# Patient Record
Sex: FEMALE | Race: WHITE | Employment: FULL TIME | URBAN - METROPOLITAN AREA
[De-identification: names, ages, dates, MRNs, and addresses within clinical notes are randomized per-mention and may not be internally consistent; named-entity substitution may affect disease eponyms.]

---
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
## 2018-02-28 NOTE — PROGRESS NOTES
"  SUBJECTIVE:   Tracee Dominguez is a 17 year old female who presents to clinic today for the following health issues:    STD Testing-no known exposure. Same partner. Denies abnormal vaginal discharge, itching, odor, pelvic or abdominal pain, abnormal urinary symptoms, fevers.     Also requesting new prescription for her inhaler.    Problem list and histories reviewed & adjusted, as indicated.  Additional history: as documented    Patient Active Problem List   Diagnosis     Intermittent asthma     Hyperhidrosis     Wart     Birth control     Dyspareunia     Foreign body (FB) in soft tissue     Past Surgical History:   Procedure Laterality Date     ENT SURGERY      broke jaw in      NO HISTORY OF SURGERY       REMOVE FOREIGN BODY UPPER EXTREMITY N/A 6/28/2017    Procedure: REMOVE FOREIGN BODY UPPER EXTREMITY;  Removal of Nexplanon;  Surgeon: Al Stratton MD;  Location:  OR       Social History   Substance Use Topics     Smoking status: Never Smoker     Smokeless tobacco: Never Used     Alcohol use No     Family History   Problem Relation Age of Onset     Thyroid Disease Mother      Depression Mother      Anxiety Disorder Mother      Depression Father      Anxiety Disorder Father            Reviewed and updated as needed this visit by clinical staff       Reviewed and updated as needed this visit by Provider         ROS:  Constitutional, HEENT, cardiovascular, pulmonary, gi and gu systems are negative, except as otherwise noted.    OBJECTIVE:     BP 97/55  Pulse 71  Temp 97.2  F (36.2  C) (Oral)  Ht 5' 6\" (1.676 m)  Wt 141 lb (64 kg)  LMP 02/09/2018 (Exact Date)  SpO2 96%  BMI 22.76 kg/m2  Body mass index is 22.76 kg/(m^2).  GENERAL: healthy, alert and no distress  RESP: lungs clear to auscultation - no rales, rhonchi or wheezes  CV: regular rate and rhythm, normal S1 S2, no S3 or S4, no murmur, click or rub, no peripheral edema and peripheral pulses strong  ABDOMEN: soft, nontender, no " hepatosplenomegaly, no masses and bowel sounds normal   (female): normal female external genitalia, normal urethral meatus, vaginal mucosa, normal cervix/adnexa/uterus without masses or discharge  MS: no gross musculoskeletal defects noted, no edema  SKIN: no suspicious lesions or rashes    Diagnostic Test Results:  none     ASSESSMENT/PLAN:   1. Screen for STD (sexually transmitted disease)  Will follow up with patient when results available. Encouraged safe sexual practices.  - Chlamydia trachomatis PCR  - Neisseria gonorrhoeae PCR  - Hepatitis B surface antigen  - HIV Antigen Antibody Combo  - Anti Treponema  - Hepatitis C antibody    2. Mild intermittent asthma without complication  Will refill inhaler prescription at this time, but patient advised she is due for follow up on her asthma with a primary care provider.  - albuterol (PROAIR HFA/PROVENTIL HFA/VENTOLIN HFA) 108 (90 BASE) MCG/ACT Inhaler; Inhale 2 puffs into the lungs every 6 hours as needed for shortness of breath / dyspnea  Dispense: 2 Inhaler; Refill: 1    DEREK Jacobo [facility]

## 2018-03-01 ENCOUNTER — OFFICE VISIT (OUTPATIENT)
Dept: OBGYN | Facility: CLINIC | Age: 18
End: 2018-03-01
Payer: COMMERCIAL

## 2018-03-01 VITALS
OXYGEN SATURATION: 96 % | HEIGHT: 66 IN | WEIGHT: 141 LBS | SYSTOLIC BLOOD PRESSURE: 97 MMHG | HEART RATE: 71 BPM | DIASTOLIC BLOOD PRESSURE: 55 MMHG | BODY MASS INDEX: 22.66 KG/M2 | TEMPERATURE: 97.2 F

## 2018-03-01 DIAGNOSIS — J45.20 MILD INTERMITTENT ASTHMA WITHOUT COMPLICATION: ICD-10-CM

## 2018-03-01 DIAGNOSIS — Z11.3 SCREEN FOR STD (SEXUALLY TRANSMITTED DISEASE): Primary | ICD-10-CM

## 2018-03-01 LAB — T PALLIDUM IGG+IGM SER QL: NEGATIVE

## 2018-03-01 PROCEDURE — 87591 N.GONORRHOEAE DNA AMP PROB: CPT | Performed by: NURSE PRACTITIONER

## 2018-03-01 PROCEDURE — 36415 COLL VENOUS BLD VENIPUNCTURE: CPT | Performed by: NURSE PRACTITIONER

## 2018-03-01 PROCEDURE — 86780 TREPONEMA PALLIDUM: CPT | Performed by: NURSE PRACTITIONER

## 2018-03-01 PROCEDURE — 86803 HEPATITIS C AB TEST: CPT | Performed by: NURSE PRACTITIONER

## 2018-03-01 PROCEDURE — 87491 CHLMYD TRACH DNA AMP PROBE: CPT | Performed by: NURSE PRACTITIONER

## 2018-03-01 PROCEDURE — 87389 HIV-1 AG W/HIV-1&-2 AB AG IA: CPT | Performed by: NURSE PRACTITIONER

## 2018-03-01 PROCEDURE — 87340 HEPATITIS B SURFACE AG IA: CPT | Performed by: NURSE PRACTITIONER

## 2018-03-01 PROCEDURE — 99213 OFFICE O/P EST LOW 20 MIN: CPT | Performed by: NURSE PRACTITIONER

## 2018-03-01 RX ORDER — ALBUTEROL SULFATE 90 UG/1
2 AEROSOL, METERED RESPIRATORY (INHALATION) EVERY 6 HOURS PRN
Qty: 2 INHALER | Refills: 1 | Status: SHIPPED | OUTPATIENT
Start: 2018-03-01 | End: 2019-05-31

## 2018-03-01 ASSESSMENT — PAIN SCALES - GENERAL: PAINLEVEL: NO PAIN (0)

## 2018-03-01 NOTE — MR AVS SNAPSHOT
"              After Visit Summary   3/1/2018    Tracee Dominguez    MRN: [id number]           Patient Information     Date Of Birth          2000        Visit Information        Provider Department      3/1/2018 8:10 AM Merced Anton APRN CNP [facility]        Today's Diagnoses     Screen for STD (sexually transmitted disease)    -  1    Mild intermittent asthma without complication           Follow-ups after your visit        Who to contact     If you have questions or need follow up information about today's clinic visit or your schedule please contact [facility] directly at [phone].  Normal or non-critical lab and imaging results will be communicated to you by GroundedPowerhart, letter or phone within 4 business days after the clinic has received the results. If you do not hear from us within 7 days, please contact the clinic through GroundedPowerhart or phone. If you have a critical or abnormal lab result, we will notify you by phone as soon as possible.  Submit refill requests through Crunched or call your pharmacy and they will forward the refill request to us. Please allow 3 business days for your refill to be completed.          Additional Information About Your Visit        MyChart Information     Crunched lets you send messages to your doctor, view your test results, renew your prescriptions, schedule appointments and more. To sign up, go to [email or web address] . Click on \"Log in\" on the left side of the screen, which will take you to the Welcome page. Then click on \"Sign up Now\" on the right side of the page.     You will be asked to enter the access code listed below, as well as some personal information. Please follow the directions to create your username and password.     Your access code is: [email or web address]  Expires: 2018  9:12 AM     Your access code will  in 90 days. If you need help or a new code, please call your [facility] or [phone].        Care " "EveryWhere ID     This is your Care EveryWhere ID. This could be used by other organizations to access your [place] medical records  [id number]        Your Vitals Were     Pulse Temperature Height Last Period Pulse Oximetry BMI (Body Mass Index)    71 97.2  F (36.2  C) (Oral) 5' 6\" (1.676 m) 02/09/2018 (Exact Date) 96% 22.76 kg/m2       Blood Pressure from Last 3 Encounters:   03/01/18 97/55   10/09/17 125/69   07/11/17 104/62    Weight from Last 3 Encounters:   03/01/18 141 lb (64 kg) (71 %)*   10/09/17 150 lb (68 kg) (81 %)*   07/11/17 148 lb (67.1 kg) (80 %)*     * Growth percentiles are based on [facility] 2-20 Years data.              We Performed the Following     Anti Treponema     Chlamydia trachomatis PCR     Hepatitis B surface antigen     Hepatitis C antibody     HIV Antigen Antibody Combo     Neisseria gonorrhoeae PCR          Today's Medication Changes          These changes are accurate as of 3/1/18  8:34 AM.  If you have any questions, ask your nurse or doctor.               Stop taking these medicines if you haven't already. Please contact your care team if you have questions.     oxyCODONE-acetaminophen 5-325 MG per tablet   Commonly known as:  PERCOCET   Stopped by:  Merced Anton APRN CNP                Where to get your medicines      These medications were sent to [place] [address] [facility] - [address] [address]     Phone:  [phone]     albuterol 108 (90 BASE) MCG/ACT Inhaler                Primary Care Provider Office Phone # Fax #    Fatou Enrique [phone] [address] [facility] [address]        Equal Access to Services     [facility] GERMAN AH: Nguyễn dolan Sonandini, waaxda luqadaha, qaybta kaalmada juan pablo, jenifer owens. So [facility] [phone].    ATENCIÓN: Si habla español, tiene a wang disposición servicios gratuitos de asistencia lingüística. Lljessica al [phone].    We " comply with applicable federal civil rights laws and [place] laws. We do not discriminate on the basis of race, color, national origin, age, disability, sex, sexual orientation, or gender identity.            Thank you!     [facility] you for [facility]  [facility] your [facility]. Our goal is always to provide you with excellent care. Hearing back from our patients is one way we can continue to improve our services. Please take a few minutes to complete the written survey that you may receive in the mail after your visit with us. Thank you!             Your Updated Medication List - Protect others around you: Learn how to safely use, store and throw away your medicines at www.disposemymeds.org.          This list is accurate as of 3/1/18  8:34 AM.  Always use your most recent med list.                   Brand Name Dispense Instructions for use Diagnosis    albuterol 108 (90 BASE) MCG/ACT Inhaler    PROAIR HFA/PROVENTIL HFA/VENTOLIN HFA    2 Inhaler    Inhale 2 puffs into the lungs every 6 hours as needed for shortness of breath / dyspnea    Mild intermittent asthma without complication       desogestrel-ethinyl estradiol 0.15-30 MG-MCG per tablet    APRI    84 tablet    Take 1 tablet by mouth daily    Birth control counseling

## 2018-03-01 NOTE — NURSING NOTE
"Chief Complaint   Patient presents with     STD     Testing       Initial BP 97/55  Pulse 71  Temp 97.2  F (36.2  C) (Oral)  Ht 5' 6\" (1.676 m)  Wt 141 lb (64 kg)  LMP 02/09/2018 (Exact Date)  SpO2 96%  BMI 22.76 kg/m2 Estimated body mass index is 22.76 kg/(m^2) as calculated from the following:    Height as of this encounter: 5' 6\" (1.676 m).    Weight as of this encounter: 141 lb (64 kg)..  BP completed using cuff size: claudia Grimaldo CMA    "

## 2018-03-02 LAB
C TRACH DNA SPEC QL NAA+PROBE: NEGATIVE
HBV SURFACE AG SERPL QL IA: NONREACTIVE
HCV AB SERPL QL IA: NONREACTIVE
HIV 1+2 AB+HIV1 P24 AG SERPL QL IA: NONREACTIVE
N GONORRHOEA DNA SPEC QL NAA+PROBE: NEGATIVE
SPECIMEN SOURCE: NORMAL
SPECIMEN SOURCE: NORMAL

## 2018-03-05 ENCOUNTER — TELEPHONE (OUTPATIENT)
Dept: OBGYN | Facility: CLINIC | Age: 18
End: 2018-03-05

## 2018-06-26 NOTE — PROGRESS NOTES
SUBJECTIVE:                                                    Tracee Dominguez is a 17 year old female who presents to clinic today for the following health issues:    URINARY TRACT SYMPTOMS      Duration: 1 week    Description  Dysuria    Intensity:  moderate    Accompanying signs and symptoms:  Fever/chills: no   Flank pain no   Nausea and vomiting: YES- nausea  Vaginal symptoms: itching  Abdominal/Pelvic Pain: no     History  History of frequent UTI's: YES  History of kidney stones: no  Sexually Active: YES  Possibility of pregnancy: Don't Know but doesn't think so. Menstrual cycle has been abnormal as pt stopped and restarted birth control. Thinks cycle should be starting soon     Precipitating or alleviating factors: None    Therapies tried and outcome: otc med - unsure of name   Outcome: no relief      Problem list and histories reviewed & adjusted, as indicated.  Additional history: as documented     Patient Active Problem List   Diagnosis     Intermittent asthma     Hyperhidrosis     Wart     Birth control     Dyspareunia     Past Surgical History:   Procedure Laterality Date     ENT SURGERY      broke jaw in      NO HISTORY OF SURGERY       REMOVE FOREIGN BODY UPPER EXTREMITY N/A 6/28/2017    Procedure: REMOVE FOREIGN BODY UPPER EXTREMITY;  Removal of Nexplanon;  Surgeon: Al Stratton MD;  Location:  OR       Social History   Substance Use Topics     Smoking status: Current Every Day Smoker     Smokeless tobacco: Never Used     Alcohol use No     Family History   Problem Relation Age of Onset     Thyroid Disease Mother      Depression Mother      Anxiety Disorder Mother      Depression Father      Anxiety Disorder Father          Current Outpatient Prescriptions   Medication Sig Dispense Refill     clotrimazole (LOTRIMIN) 1 % cream Apply topically 2 times daily 15 g 1     desogestrel-ethinyl estradiol (APRI) 0.15-30 MG-MCG per tablet Take 1 tablet by mouth daily 84 tablet 3      "albuterol (PROAIR HFA/PROVENTIL HFA/VENTOLIN HFA) 108 (90 BASE) MCG/ACT Inhaler Inhale 2 puffs into the lungs every 6 hours as needed for shortness of breath / dyspnea (Patient not taking: Reported on 6/27/2018) 2 Inhaler 1     No Known Allergies    ROS:  Constitutional, HEENT, cardiovascular, pulmonary, gi and gu systems are negative, except as otherwise noted.    OBJECTIVE:     /67  Pulse 67  Temp 97.6  F (36.4  C) (Oral)  Resp 14  Ht 5' 6\" (1.676 m)  Wt 144 lb (65.3 kg)  LMP  (LMP Unknown)  SpO2 98%  BMI 23.24 kg/m2  Body mass index is 23.24 kg/(m^2).  GENERAL: healthy, alert and no distress  RESP: lungs clear to auscultation - no rales, rhonchi or wheezes  CV: regular rate and rhythm, normal S1 S2, no S3 or S4, no murmur, click or rub, no peripheral edema and peripheral pulses strong  ABDOMEN: soft, nontender, no hepatosplenomegaly, no masses and bowel sounds normal   (female): normal female external genitalia, normal urethral meatus, vaginal   mucosa, normal cervix/adnexa/uterus without masses or discharge. Some external labial erythema.   MA was present on exam   MS: no gross musculoskeletal defects noted, no edema    Diagnostic Test Results:  Results for orders placed or performed in visit on 06/27/18 (from the past 24 hour(s))   Wet prep   Result Value Ref [facility]    Specimen Description Vagina     Wet Prep No Trichomonas seen     Wet Prep No clue cells seen     Wet Prep No yeast seen        ASSESSMENT/PLAN:         ICD-10-CM    1. Vaginal discharge N89.8 Chlamydia trachomatis PCR     Neisseria gonorrhoeae PCR     Wet prep     *UA reflex to Microscopic and Culture ([facility] (except [place] and [place])     CANCELED: *UA reflex to Microscopic and Culture ([facility] (except [place] and [place])   2. Vaginal itching L29.8 clotrimazole (LOTRIMIN) 1 % cream   she was unable to leave UA at time of visit.   Start lotrimin twice a day. warning signs discussed. "   Follow up  2 wks as needed. Labs pending      Yovany Manley PA-C  [facility]

## 2018-06-27 ENCOUNTER — OFFICE VISIT (OUTPATIENT)
Dept: FAMILY MEDICINE | Facility: CLINIC | Age: 18
End: 2018-06-27
Payer: COMMERCIAL

## 2018-06-27 VITALS
OXYGEN SATURATION: 98 % | WEIGHT: 144 LBS | HEART RATE: 67 BPM | RESPIRATION RATE: 14 BRPM | BODY MASS INDEX: 23.14 KG/M2 | HEIGHT: 66 IN | DIASTOLIC BLOOD PRESSURE: 67 MMHG | SYSTOLIC BLOOD PRESSURE: 116 MMHG | TEMPERATURE: 97.6 F

## 2018-06-27 DIAGNOSIS — N89.8 VAGINAL DISCHARGE: Primary | ICD-10-CM

## 2018-06-27 DIAGNOSIS — N89.8 VAGINAL ITCHING: ICD-10-CM

## 2018-06-27 PROBLEM — M79.5 FOREIGN BODY (FB) IN SOFT TISSUE: Status: RESOLVED | Noted: 2017-06-27 | Resolved: 2018-06-27

## 2018-06-27 LAB
SPECIMEN SOURCE: NORMAL
WET PREP SPEC: NORMAL

## 2018-06-27 PROCEDURE — 87210 SMEAR WET MOUNT SALINE/INK: CPT | Performed by: PHYSICIAN ASSISTANT

## 2018-06-27 PROCEDURE — 87591 N.GONORRHOEAE DNA AMP PROB: CPT | Performed by: PHYSICIAN ASSISTANT

## 2018-06-27 PROCEDURE — 99213 OFFICE O/P EST LOW 20 MIN: CPT | Performed by: PHYSICIAN ASSISTANT

## 2018-06-27 PROCEDURE — 87491 CHLMYD TRACH DNA AMP PROBE: CPT | Performed by: PHYSICIAN ASSISTANT

## 2018-06-27 RX ORDER — CLOTRIMAZOLE 1 %
CREAM (GRAM) TOPICAL 2 TIMES DAILY
Qty: 15 G | Refills: 1 | Status: SHIPPED | OUTPATIENT
Start: 2018-06-27 | End: 2018-07-27

## 2018-06-27 NOTE — LETTER
My Asthma Action Plan  Name: Tracee Dominguez   YOB: 2000  Date: 6/26/2018   My doctor: Yovany Manley PA-C   My clinic: [facility]        My Control Medicine: { :669373}  My Rescue Medicine: { :896491}  {AAP include Oral Steroid:608786} My Asthma Severity: { :686235}  Avoid your asthma triggers: { :227749}        {Is patient a child or adult?:448689}       GREEN ZONE   Good Control    I feel good    No cough or wheeze    Can work, sleep and play without asthma symptoms       Take your asthma control medicine every day.     1. If exercise triggers your asthma, take your rescue medication    15 minutes before exercise or sports, and    During exercise if you have asthma symptoms  2. Spacer to use with inhaler: If you have a spacer, make sure to use it with your inhaler             YELLOW ZONE Getting Worse  I have ANY of these:    I do not feel good    Cough or wheeze    Chest feels tight    Wake up at night   1. Keep taking your Green Zone medications  2. Start taking your rescue medicine:    every 20 minutes for up to 1 hour. Then every 4 hours for 24-48 hours.  3. If you stay in the Yellow Zone for more than 12-24 hours, contact your doctor.  4. If you do not return to the Green Zone in 12-24 hours or you get worse, start taking your oral steroid medicine if prescribed by your provider.           RED ZONE Medical Alert - Get Help  I have ANY of these:    I feel awful    Medicine is not helping    Breathing getting harder    Trouble walking or talking    Nose opens wide to breathe       1. Take your rescue medicine NOW  2. If your provider has prescribed an oral steroid medicine, start taking it NOW  3. Call your doctor NOW  4. If you are still in the Red Zone after 20 minutes and you have not reached your doctor:    Take your rescue medicine again and    Call 911 or go to the emergency room right away    See your regular doctor within 2 weeks of an Emergency Room or Urgent Care  visit for follow-up treatment.          Annual Reminders:  Meet with Asthma Educator,  Flu Shot in the Fall, consider Pneumonia Vaccination for patients with asthma (aged 19 and older).    Pharmacy:    [id number] - [place], [place] - [address] [facility] [address] [facility] - [place], [place] - [address]                      Asthma Triggers  How To Control Things That Make Your Asthma Worse    Triggers are things that make your asthma worse.  Look at the list below to help you find your triggers and what you can do about them.  You can help prevent asthma flare-ups by staying away from your triggers.      Trigger                                                          What you can do   Cigarette Smoke  Tobacco smoke can make asthma worse. Do not allow smoking in your home, car or around you.  Be sure no one smokes at a child s day care or school.  If you smoke, ask your health care provider for ways to help you quit.  Ask family members to quit too.  Ask your health care provider for a referral to Quit Plan to help you quit smoking, or call 9-219-533-PLAN.     Colds, Flu, Bronchitis  These are common triggers of asthma. Wash your hands often.  Don t touch your eyes, nose or mouth.  Get a flu shot every year.     Dust Mites  These are tiny bugs that live in cloth or carpet. They are too small to see. Wash sheets and blankets in hot water every week.   Encase pillows and mattress in dust mite proof covers.  Avoid having carpet if you can. If you have carpet, vacuum weekly.   Use a dust mask and HEPA vacuum.   Pollen and Outdoor Mold  Some people are allergic to trees, grass, or weed pollen, or molds. Try to keep your windows closed.  Limit time out doors when pollen count is high.   Ask you health care provider about taking medicine during allergy season.     Animal Dander  Some people are allergic to skin flakes, urine or saliva from pets with fur or feathers. Keep pets with fur or feathers out of  your home.    If you can t keep the pet outdoors, then keep the pet out of your bedroom.  Keep the bedroom door closed.  Keep pets off cloth furniture and away from stuffed toys.     Mice, Rats, and Cockroaches  Some people are allergic to the waste from these pests.   Cover food and garbage.  Clean up spills and food crumbs.  Store grease in the refrigerator.   Keep food out of the bedroom.   Indoor Mold  This can be a trigger if your home has high moisture. Fix leaking faucets, pipes, or other sources of water.   Clean moldy surfaces.  Dehumidify basement if it is damp and smelly.   Smoke, Strong Odors, and Sprays  These can reduce air quality. Stay away from strong odors and sprays, such as perfume, powder, hair spray, paints, smoke incense, paint, cleaning products, candles and new carpet.   Exercise or Sports  Some people with asthma have this trigger. Be active!  Ask your doctor about taking medicine before sports or exercise to prevent symptoms.    Warm up for 5-10 minutes before and after sports or exercise.     Other Triggers of Asthma  Cold air:  Cover your nose and mouth with a scarf.  Sometimes laughing or crying can be a trigger.  Some medicines and food can trigger asthma.

## 2018-06-27 NOTE — MR AVS SNAPSHOT
"              After Visit Summary   6/27/2018    Tracee Dominguez    MRN: [id number]           Patient Information     Date Of Birth          2000        Visit Information        Provider Department      6/27/2018 10:20 AM Yovany Manley PA-C [facility]        Today's Diagnoses     Vaginal discharge    -  1    Vaginal itching           Follow-ups after your visit        Who to contact     If you have questions or need follow up information about today's clinic visit or your schedule please contact [facility] directly at [phone].  Normal or non-critical lab and imaging results will be communicated to you by MyChart, letter or phone within 4 business days after the clinic has received the results. If you do not hear from us within 7 days, please contact the clinic through MyChart or phone. If you have a critical or abnormal lab result, we will notify you by phone as soon as possible.  Submit refill requests through [employer] or call your pharmacy and they will forward the refill request to us. Please allow 3 business days for your refill to be completed.          Additional Information About Your Visit        Care EveryWhere ID     This is your Care EveryWhere ID. This could be used by other organizations to access your [place] medical records  [id number]        Your Vitals Were     Pulse Temperature Respirations Height Last Period Pulse Oximetry    67 97.6  F (36.4  C) (Oral) 14 5' 6\" (1.676 m) (LMP Unknown) 98%    BMI (Body Mass Index)                   23.24 kg/m2            Blood Pressure from Last 3 Encounters:   06/27/18 116/67   03/01/18 97/55   10/09/17 125/69    Weight from Last 3 Encounters:   06/27/18 144 lb (65.3 kg) (74 %)*   03/01/18 141 lb (64 kg) (71 %)*   10/09/17 150 lb (68 kg) (81 %)*     * Growth percentiles are based on CDC 2-20 Years data.              We Performed the Following     *UA reflex to Microscopic and Culture ([place] [facility] " (except [place] and [place])     Chlamydia trachomatis PCR     Neisseria gonorrhoeae PCR     Wet prep          Today's Medication Changes          These changes are accurate as of 6/27/18 10:59 AM.  If you have any questions, ask your nurse or doctor.               Start taking these medicines.        Dose/Directions    clotrimazole 1 % cream   Commonly known as:  LOTRIMIN   Used for:  Vaginal itching   Started by:  Yovany Manley PA-C        Apply topically 2 times daily   Quantity:  15 g   Refills:  1            Where to get your medicines      These medications were sent to [place] [address] [facility] - [facility] [address]  [address] [facility] [address]     Phone:  [phone]     clotrimazole 1 % cream                Primary Care Provider Office Phone # Fax #    Fatou Enrique [phone] [address] [facility] [address]        Equal Access to Services     [facility] : Hadii saundra ku hadasho Soomaali, waaxda luqadaha, qaybta kaalmada adeegyada, waxay brightin hayisatun nahomy dejesus . So [facility] [phone].    ATENCIÓN: Si youla espharrison, tiene a wang disposición servicios gratuitos de asistencia lingüística. Llame al [phone].    We comply with applicable federal civil rights laws and [place] laws. We do not discriminate on the basis of race, color, national origin, age, disability, sex, sexual orientation, or gender identity.            Thank you!     Thank you for choosing [facility]  for your care. Our goal is always to provide you with excellent care. Hearing back from our patients is one way we can continue to improve our services. Please take a few minutes to complete the written survey that you may receive in the mail after your visit with us. Thank you!             Your Updated Medication List - Protect others around you: Learn how to safely use, store and throw away your medicines at www.disposemymeds.org.          This list  is accurate as of 6/27/18 10:59 AM.  Always use your most recent med list.                   Brand Name Dispense Instructions for use Diagnosis    albuterol 108 (90 Base) MCG/ACT Inhaler    PROAIR HFA/PROVENTIL HFA/VENTOLIN HFA    2 Inhaler    Inhale 2 puffs into the lungs every 6 hours as needed for shortness of breath / dyspnea    Mild intermittent asthma without complication       clotrimazole 1 % cream    LOTRIMIN    15 g    Apply topically 2 times daily    Vaginal itching       desogestrel-ethinyl estradiol 0.15-30 MG-MCG per tablet    APRI    84 tablet    Take 1 tablet by mouth daily    Birth control counseling

## 2018-06-27 NOTE — NURSING NOTE
"Chief Complaint   Patient presents with     Urinary Problem     Health Maintenance     ACT, HPV       Initial /67  Pulse 67  Temp 97.6  F (36.4  C) (Oral)  Resp 14  Ht 5' 6\" (1.676 m)  Wt 144 lb (65.3 kg)  LMP  (LMP Unknown)  SpO2 98%  BMI 23.24 kg/m2 Estimated body mass index is 23.24 kg/(m^2) as calculated from the following:    Height as of this encounter: 5' 6\" (1.676 m).    Weight as of this encounter: 144 lb (65.3 kg).  Medication Reconciliation: complete  Renetta Morrow CMA    "

## 2018-06-28 ASSESSMENT — ASTHMA QUESTIONNAIRES: ACT_TOTALSCORE: 23

## 2018-07-26 NOTE — PROGRESS NOTES
SUBJECTIVE:   Tracee Dominguez is a 18 year old female who presents to clinic today for the following health issues:    STD Testing  She is currently using combined oral contraceptive pill and wants to discuss non hormonal methods.   Also, requests pregnancy test-no concerns, no missed cycles and continues her oral contraceptive pill, just wants reassurance.  LMP 7/13/18    Patient has no known STI exposure, no new partner. Denies abnormal vaginal discharge, odor, irritation, but mild intermittent itching. Denies lesions, pelvic pain, fevers, bowel changes. No new products.   Feels she has been on combined oral contraceptive pills for a few years and wants to try something without hormones. Had to have Nexplanon surgically removed, so not interested in anything implanted, inserting into her body.    Problem list and histories reviewed & adjusted, as indicated.  Additional history: as documented    Patient Active Problem List   Diagnosis     Intermittent asthma     Hyperhidrosis     Wart     Birth control     Dyspareunia     Past Surgical History:   Procedure Laterality Date     ENT SURGERY      broke jaw in      NO HISTORY OF SURGERY       REMOVE FOREIGN BODY UPPER EXTREMITY N/A 6/28/2017    Procedure: REMOVE FOREIGN BODY UPPER EXTREMITY;  Removal of Nexplanon;  Surgeon: Al Stratton MD;  Location:  OR       Social History   Substance Use Topics     Smoking status: Light Tobacco Smoker     Packs/day: 0.25     Types: Cigarettes     Smokeless tobacco: Never Used      Comment: 1 cig a day     Alcohol use No     Family History   Problem Relation Age of Onset     Thyroid Disease Mother      Depression Mother      Anxiety Disorder Mother      Depression Father      Anxiety Disorder Father            Reviewed and updated as needed this visit by clinical staff       Reviewed and updated as needed this visit by Provider         ROS:  Constitutional, HEENT, cardiovascular, pulmonary, gi and gu systems  "are negative, except as otherwise noted.    OBJECTIVE:     /71  Pulse 65  Temp 97.8  F (36.6  C) (Oral)  Ht 5' 6\" (1.676 m)  Wt 140 lb 9.6 oz (63.8 kg)  LMP 07/13/2018 (Approximate)  SpO2 98%  BMI 22.69 kg/m2  Body mass index is 22.69 kg/(m^2).   GENERAL: healthy, alert and no distress  RESP: lungs clear to auscultation - no rales, rhonchi or wheezes  CV: regular rate and rhythm, normal S1 S2, no S3 or S4, no murmur, click or rub, no peripheral edema and peripheral pulses strong  ABDOMEN: soft, nontender, no hepatosplenomegaly, no masses and bowel sounds normal   (female): normal female external genitalia, normal urethral meatus, vaginal mucosa, normal cervix/adnexa/uterus without masses or discharge  MS: no gross musculoskeletal defects noted, no edema  SKIN: no suspicious lesions or rashes  PSYCH: mentation appears normal, affect normal/bright    Diagnostic Test Results:  Results for orders placed or performed in visit on 07/27/18 (from the past 24 hour(s))   Beta HCG qual IFA urine - [facility] and [place]   Result Value Ref Range    Beta HCG Qual IFA Urine Negative NEG^Negative      Wet prep   Result Value Ref Range    Specimen Description Vagina     Wet Prep No yeast seen     Wet Prep No clue cells seen     Wet Prep No Trichomonas seen      A/P:  (Z11.3) Screen for STD (sexually transmitted disease)  (primary encounter diagnosis)  Plan: Chlamydia trachomatis PCR, Neisseria         gonorrhoeae PCR, Hepatitis B surface antigen,         Hepatitis C antibody, Treponema Abs w Reflex to        RPR and Titer, HIV Antigen Antibody Combo, Wet         prep        (Z32.00) Encounter for pregnancy test, result unknown  Comment: Patient aware of negative results  Plan: Beta HCG qual IFA urine - [facility] and [place]          (Z30.09) Birth control counseling  Comment: Reviewed non hormonal options with patient. Not interested in copper IUD or diaphragm. Plans condoms now and will be consistent. To call if " alternate desired.    DEREK Jacobo [facility]

## 2018-07-27 ENCOUNTER — OFFICE VISIT (OUTPATIENT)
Dept: OBGYN | Facility: CLINIC | Age: 18
End: 2018-07-27
Payer: COMMERCIAL

## 2018-07-27 VITALS
BODY MASS INDEX: 22.6 KG/M2 | WEIGHT: 140.6 LBS | OXYGEN SATURATION: 98 % | HEART RATE: 65 BPM | TEMPERATURE: 97.8 F | DIASTOLIC BLOOD PRESSURE: 71 MMHG | SYSTOLIC BLOOD PRESSURE: 104 MMHG | HEIGHT: 66 IN

## 2018-07-27 DIAGNOSIS — Z11.3 SCREEN FOR STD (SEXUALLY TRANSMITTED DISEASE): Primary | ICD-10-CM

## 2018-07-27 DIAGNOSIS — Z30.09 BIRTH CONTROL COUNSELING: ICD-10-CM

## 2018-07-27 DIAGNOSIS — Z32.00 ENCOUNTER FOR PREGNANCY TEST, RESULT UNKNOWN: ICD-10-CM

## 2018-07-27 LAB
BETA HCG QUAL IFA URINE: NEGATIVE
SPECIMEN SOURCE: NORMAL
WET PREP SPEC: NORMAL

## 2018-07-27 PROCEDURE — 87340 HEPATITIS B SURFACE AG IA: CPT | Performed by: NURSE PRACTITIONER

## 2018-07-27 PROCEDURE — 86803 HEPATITIS C AB TEST: CPT | Performed by: NURSE PRACTITIONER

## 2018-07-27 PROCEDURE — 84703 CHORIONIC GONADOTROPIN ASSAY: CPT | Performed by: NURSE PRACTITIONER

## 2018-07-27 PROCEDURE — 87389 HIV-1 AG W/HIV-1&-2 AB AG IA: CPT | Performed by: NURSE PRACTITIONER

## 2018-07-27 PROCEDURE — 36415 COLL VENOUS BLD VENIPUNCTURE: CPT | Performed by: NURSE PRACTITIONER

## 2018-07-27 PROCEDURE — 87491 CHLMYD TRACH DNA AMP PROBE: CPT | Performed by: NURSE PRACTITIONER

## 2018-07-27 PROCEDURE — 86780 TREPONEMA PALLIDUM: CPT | Performed by: NURSE PRACTITIONER

## 2018-07-27 PROCEDURE — 87591 N.GONORRHOEAE DNA AMP PROB: CPT | Performed by: NURSE PRACTITIONER

## 2018-07-27 PROCEDURE — 99213 OFFICE O/P EST LOW 20 MIN: CPT | Performed by: NURSE PRACTITIONER

## 2018-07-27 PROCEDURE — 87210 SMEAR WET MOUNT SALINE/INK: CPT | Performed by: NURSE PRACTITIONER

## 2018-07-27 ASSESSMENT — PAIN SCALES - GENERAL: PAINLEVEL: NO PAIN (0)

## 2018-07-27 NOTE — NURSING NOTE
"Chief Complaint   Patient presents with     STD     Testing       Initial /71  Pulse 65  Temp 97.8  F (36.6  C) (Oral)  Ht 5' 6\" (1.676 m)  Wt 140 lb 9.6 oz (63.8 kg)  LMP 07/13/2018 (Approximate)  SpO2 98%  BMI 22.69 kg/m2 Estimated body mass index is 22.69 kg/(m^2) as calculated from the following:    Height as of this encounter: 5' 6\" (1.676 m).    Weight as of this encounter: 140 lb 9.6 oz (63.8 kg)..  BP completed using cuff size: claudia Grimaldo CMA    "

## 2018-07-27 NOTE — MR AVS SNAPSHOT
"              After Visit Summary   7/27/2018    Tracee Dominguez    MRN: [id number]           Patient Information     Date Of Birth          2000        Visit Information        Provider Department      7/27/2018 11:50 AM Merced Anton APRN CNP [facility]        Today's Diagnoses     Screen for STD (sexually transmitted disease)    -  1    Encounter for pregnancy test, result unknown        Birth control counseling           Follow-ups after your visit        Who to contact     If you have questions or need follow up information about today's clinic visit or your schedule please contact [facility] directly at [phone].  Normal or non-critical lab and imaging results will be communicated to you by MyChart, letter or phone within 4 business days after the clinic has received the results. If you do not hear from us within 7 days, please contact the clinic through MyChart or phone. If you have a critical or abnormal lab result, we will notify you by phone as soon as possible.  Submit refill requests through [employer] or call your pharmacy and they will forward the refill request to us. Please allow 3 business days for your refill to be completed.          Additional Information About Your Visit        Care EveryWhere ID     This is your Care EveryWhere ID. This could be used by other organizations to access your [place] medical records  [id number]        Your Vitals Were     Pulse Temperature Height Last Period Pulse Oximetry BMI (Body Mass Index)    65 97.8  F (36.6  C) (Oral) 5' 6\" (1.676 m) 07/13/2018 (Approximate) 98% 22.69 kg/m2       Blood Pressure from Last 3 Encounters:   07/27/18 104/71   06/27/18 116/67   03/01/18 97/55    Weight from Last 3 Encounters:   07/27/18 140 lb 9.6 oz (63.8 kg)   06/27/18 144 lb (65.3 kg) (74 %)*   03/01/18 141 lb (64 kg) (71 %)*     * Growth percentiles are based on CDC 2-20 Years data.              We Performed the Following     Beta HCG " qual IFA urine - [facility] [place]     Chlamydia trachomatis PCR     Hepatitis B surface antigen     Hepatitis C antibody     HIV Antigen Antibody Combo     Neisseria gonorrhoeae PCR     Treponema Abs w Reflex to RPR and Titer     Wet prep          Today's Medication Changes          These changes are accurate as of 7/27/18  1:30 PM.  If you have any questions, ask your nurse or doctor.               Stop taking these medicines if you haven't already. Please contact your care team if you have questions.     clotrimazole 1 % cream   Commonly known as:  LOTRIMIN   Stopped by:  Merced Anton APRN CNP                    Primary Care Provider Office Phone # Fax #    Fatou Enrique [phone] [address] [facility] [address]        Equal Access to Services     [facility] : Hadii saundra wallace hadasho Soomaali, waaxda luqadaha, qaybta kaalmada adeegyada, waxay idiin haybrad dejesus . So [facility] [phone].    ATENCIÓN: Si habla español, tiene a wang disposición servicios gratuitos de asistencia lingüística. Llame al [phone].    We comply with applicable federal civil rights laws and [place] laws. We do not discriminate on the basis of race, color, national origin, age, disability, sex, sexual orientation, or gender identity.            Thank you!     Thank you for choosing [facility]  for your care. Our goal is always to provide you with excellent care. Hearing back from our patients is one way we can continue to improve our services. Please take a few minutes to complete the written survey that you may receive in the mail after your visit with us. Thank you!             Your Updated Medication List - Protect others around you: Learn how to safely use, store and throw away your medicines at www.disposemymeds.org.          This list is accurate as of 7/27/18  1:30 PM.  Always use your most recent med list.                   Brand Name Dispense Instructions  for use Diagnosis    albuterol 108 (90 Base) MCG/ACT Inhaler    PROAIR HFA/PROVENTIL HFA/VENTOLIN HFA    2 Inhaler    Inhale 2 puffs into the lungs every 6 hours as needed for shortness of breath / dyspnea    Mild intermittent asthma without complication       desogestrel-ethinyl estradiol 0.15-30 MG-MCG per tablet    APRI    84 tablet    Take 1 tablet by mouth daily    Birth control counseling

## 2018-07-27 NOTE — LETTER
[facility]  [address] [facility] [address]  Phone: [phone]    07/30/18    Tracee Dominguez  [address] [place] [id number]      Dear Tracee-     Your labs are negative for sexually transmitted infections. Please follow up as needed.     Sincerely,      DEREK Jacobo CNP

## 2018-07-28 LAB — T PALLIDUM AB SER QL: NONREACTIVE

## 2018-07-30 LAB
HBV SURFACE AG SERPL QL IA: NONREACTIVE
HCV AB SERPL QL IA: NONREACTIVE
HIV 1+2 AB+HIV1 P24 AG SERPL QL IA: NONREACTIVE

## 2018-09-24 ENCOUNTER — OFFICE VISIT (OUTPATIENT)
Dept: OBGYN | Facility: CLINIC | Age: 18
End: 2018-09-24
Payer: COMMERCIAL

## 2018-09-24 VITALS
WEIGHT: 145 LBS | DIASTOLIC BLOOD PRESSURE: 62 MMHG | SYSTOLIC BLOOD PRESSURE: 122 MMHG | HEART RATE: 82 BPM | OXYGEN SATURATION: 97 % | BODY MASS INDEX: 23.4 KG/M2

## 2018-09-24 DIAGNOSIS — T19.2XXA FOREIGN BODY IN VAGINA, INITIAL ENCOUNTER: Primary | ICD-10-CM

## 2018-09-24 PROCEDURE — 99213 OFFICE O/P EST LOW 20 MIN: CPT | Performed by: NURSE PRACTITIONER

## 2018-09-24 NOTE — PROGRESS NOTES
SUBJECTIVE:   Tracee Dominguez is a 18 year old female who presents to clinic today for the following health issues:    Patient and her significant other lost a condom on Saturday night. They have both tried and have not been able to remove it from the vagina. Fell off during intercourse, stopped activity. Denies pelvic pain, abnormal vaginal discharge, odor, irritation. Not sexually active since then. Using oral contraceptive pills.    Problem list and histories reviewed & adjusted, as indicated.  Additional history: as documented    Patient Active Problem List   Diagnosis     Intermittent asthma     Hyperhidrosis     Wart     Birth control     Dyspareunia     Past Surgical History:   Procedure Laterality Date     ENT SURGERY      broke jaw in      NO HISTORY OF SURGERY       REMOVE FOREIGN BODY UPPER EXTREMITY N/A 6/28/2017    Procedure: REMOVE FOREIGN BODY UPPER EXTREMITY;  Removal of Nexplanon;  Surgeon: Al Stratton MD;  Location:  OR       Social History   Substance Use Topics     Smoking status: Light Tobacco Smoker     Packs/day: 0.25     Types: Cigarettes     Smokeless tobacco: Never Used      Comment: 1 cig a day     Alcohol use No     Family History   Problem Relation Age of Onset     Thyroid Disease Mother      Depression Mother      Anxiety Disorder Mother      Depression Father      Anxiety Disorder Father            Reviewed and updated as needed this visit by clinical staff       Reviewed and updated as needed this visit by Provider         ROS:  Constitutional, HEENT, cardiovascular, pulmonary, gi and gu systems are negative, except as otherwise noted.    OBJECTIVE:     /62  Pulse 82  Wt 145 lb (65.8 kg)  LMP 09/16/2018  SpO2 97%  Breastfeeding? No  BMI 23.4 kg/m2  Body mass index is 23.4 kg/(m^2).  GENERAL: healthy, alert and no distress  ABDOMEN: soft, nontender, no hepatosplenomegaly, no masses and bowel sounds normal   (female): Speculum placed in the vagina  and condom was visualized. No abnormal vaginal discharge.  MS: no gross musculoskeletal defects noted, no edema    ASSESSMENT/PLAN:   1. Foreign body in vagina, initial encounter  Patient advised a condom was visible. With her consent, condom grasped with a ring forceps and removed intact. Visual inspection of the vagina afterward showed no residual material. Return to clinic PRN.     DEREK Jacobo [facility]

## 2018-09-24 NOTE — MR AVS SNAPSHOT
After Visit Summary   9/24/2018    Tracee Dominguez    MRN: [id number]           Patient Information     Date Of Birth          2000        Visit Information        Provider Department      9/24/2018 2:50 PM Merced Anton APRN CNP [facility]        Today's Diagnoses     Foreign body in vagina, initial encounter    -  1       Follow-ups after your visit        Who to contact     If you have questions or need follow up information about today's clinic visit or your schedule please contact [facility] directly at [phone].  Normal or non-critical lab and imaging results will be communicated to you by MyChart, letter or phone within 4 business days after the clinic has received the results. If you do not hear from us within 7 days, please contact the clinic through MyChart or phone. If you have a critical or abnormal lab result, we will notify you by phone as soon as possible.  Submit refill requests through [employer] or call your pharmacy and they will forward the refill request to us. Please allow 3 business days for your refill to be completed.          Additional Information About Your Visit        Care EveryWhere ID     This is your Care EveryWhere ID. This could be used by other organizations to access your [place] medical records  [id number]        Your Vitals Were     Pulse Last Period Pulse Oximetry Breastfeeding? BMI (Body Mass Index)       82 09/16/2018 97% No 23.4 kg/m2        Blood Pressure from Last 3 Encounters:   09/24/18 122/62   07/27/18 104/71   06/27/18 116/67    Weight from Last 3 Encounters:   09/24/18 145 lb (65.8 kg)   07/27/18 140 lb 9.6 oz (63.8 kg)   06/27/18 144 lb (65.3 kg) (74 %)*     * Growth percentiles are based on CDC 2-20 Years data.              Today, you had the following     No orders found for display       Primary Care Provider Office Phone # Fax #    Fatou Enrique [phone] [address] [address]  [facility] [address]        Equal Access to Services     [facility] GERMAN : Hadii aad ku hadtamitony Shawneenandini, watrungda luqadaha, qabruceta kaalmajenifer newman. So [facility] [phone].    ATENCIÓN: Si habla español, tiene a wang disposición servicios gratuitos de asistencia lingüística. Ll[facility] [phone].    We comply with applicable federal civil rights laws and [place] laws. We do not discriminate on the basis of race, color, national origin, age, disability, sex, sexual orientation, or gender identity.            Thank you!     Thank you for choosing [facility]  for your care. Our goal is always to provide you with excellent care. Hearing back from our patients is one way we can continue to improve our services. Please take a few minutes to complete the written survey that you may receive in the mail after your visit with us. Thank you!             Your Updated Medication List - Protect others around you: Learn how to safely use, store and throw away your medicines at www.disposemymeds.org.          This list is accurate as of 9/24/18  3:08 PM.  Always use your most recent med list.                   Brand Name Dispense Instructions for use Diagnosis    albuterol 108 (90 Base) MCG/ACT inhaler    PROAIR HFA/PROVENTIL HFA/VENTOLIN HFA    2 Inhaler    Inhale 2 puffs into the lungs every 6 hours as needed for shortness of breath / dyspnea    Mild intermittent asthma without complication       desogestrel-ethinyl estradiol 0.15-30 MG-MCG per tablet    APRI    84 tablet    Take 1 tablet by mouth daily    Birth control counseling

## 2019-02-21 ENCOUNTER — OFFICE VISIT (OUTPATIENT)
Dept: DERMATOLOGY | Facility: CLINIC | Age: 19
End: 2019-02-21
Payer: COMMERCIAL

## 2019-02-21 VITALS — HEART RATE: 63 BPM | OXYGEN SATURATION: 97 % | DIASTOLIC BLOOD PRESSURE: 63 MMHG | SYSTOLIC BLOOD PRESSURE: 112 MMHG

## 2019-02-21 DIAGNOSIS — L70.0 ACNE VULGARIS: Primary | ICD-10-CM

## 2019-02-21 PROCEDURE — 99213 OFFICE O/P EST LOW 20 MIN: CPT | Performed by: PHYSICIAN ASSISTANT

## 2019-02-21 RX ORDER — DOXYCYCLINE 100 MG/1
CAPSULE ORAL
Qty: 60 CAPSULE | Refills: 2 | Status: SHIPPED | OUTPATIENT
Start: 2019-02-21 | End: 2020-01-29

## 2019-02-21 RX ORDER — NORETHINDRONE ACETATE AND ETHINYL ESTRADIOL 1MG-20(21)
1 KIT ORAL DAILY
Qty: 84 TABLET | Refills: 3 | Status: SHIPPED | OUTPATIENT
Start: 2019-02-21 | End: 2020-01-29

## 2019-02-21 RX ORDER — TRETINOIN 0.25 MG/G
CREAM TOPICAL
Qty: 45 G | Refills: 11 | Status: SHIPPED | OUTPATIENT
Start: 2019-02-21 | End: 2020-01-29

## 2019-02-21 NOTE — PATIENT INSTRUCTIONS
Directions for acne:    Wash with Dove 1-2 times per day  Apply tretinoin cream - pea size to entire face once per day   Moisturizer over     Start Lo estrin (birth control) daily   Start doxycycline (antibiotic) twice per day with food and full glass of water

## 2019-02-21 NOTE — LETTER
2/21/2019         RE: Tracee Dominguez  [address]  [facility] [address]        Dear Colleague,    Thank you for referring your patient, Tracee Dominguez, to the [facility]. Please see a copy of my visit note below.    HPI:   Tracee Dominguez is a 18 year old female who presents for acne.  chief complaint  Condition has been present for: several years and is worsening  Pt complains of pain: Yes     Previous treatments include: OTC only  Areas Involved: face  Current Outpatient Medications   Medication Sig Dispense Refill     albuterol (PROAIR HFA/PROVENTIL HFA/VENTOLIN HFA) 108 (90 BASE) MCG/ACT Inhaler Inhale 2 puffs into the lungs every 6 hours as needed for shortness of breath / dyspnea 2 Inhaler 1     doxycycline monohydrate (MONODOX) 100 MG capsule 1 cap PO BID with food and full glass of water 60 capsule 2     norethindrone-ethinyl estradiol (JUNEL FE 1/20) 1-20 MG-MCG tablet Take 1 tablet by mouth daily 84 tablet 3     tretinoin (RETIN-A) 0.025 % external cream Use every night 45 g 11     desogestrel-ethinyl estradiol (APRI) 0.15-30 MG-MCG per tablet Take 1 tablet by mouth daily (Patient not taking: Reported on 2/21/2019) 84 tablet 3     No Known Allergies  Denies any other skin complaints, in general feels well: Yes  Review of symptoms otherwise negative:Yes    PHYSICAL EXAM:   A&Ox3: Yes   Well developed/well nourished female Yes   Mood appropriate Yes      /63 (BP Location: Left arm, Patient Position: Chair, Cuff Size: Adult Regular)   Pulse 63   SpO2 97%   Type 2 skin. Mood appropriate  Alert and Oriented X 3. Well developed, well nourished in no distress.  General appearance: Normal  Head including face: Normal  Eyes: conjunctiva and lids: Normal  Mouth: Lips, teeth, gums: Normal  Neck: Normal  Back: clear  Cardiovascular: Exam of peripheral vascular system by observation for swelling, varicosities, edema: Normal  Extremities: digits/nails (clubbing): Normal  Right upper  extremity: Normal  Left upper extremity: Normal  Right lower extremity: Normal  Left lower extremity: Normal  Skin: Scalp and body hair: See below     Comedones Papules/Pustules Cysts Staining Scarring   Face/Neck 2+ macrocomedones on the chin 2+  0 0 0   Chest 0 0 0 0 0   Back 0 0 0 0 0     Telangiectasias: No Fixed Erythema: No Exoriations: No   Other Physical Exam Findings:    ASSESSMENT & PLAN:   1. Acne Vulgaris - advised on diagnosis and treatment options. Discussed use of topical medications and antibiotics. Was on OCPs but felt like she gained weight.   --Start doxycycline 100 mg BID x 3 months. Advised to take with food. Discussed risk of GI upset, esophagitis and photosensitivity.   --Start Loestrin every day. Advised to take at same time every single day. Discussed increased risk of DVT; denies any personal or fhx of coagulopathy; does not smoke. Advised if experiencing any unexplained pain or swelling in legs, CP or SOB to contact clinic immediately.   --Start tretinoin 0.025% cream qd          Pt advised on use and risks including photosensitivity, allergic reactions, GI upset, headaches, nausea, erythema, scaling, vertigo, asthralgias, blood clots:Yes    Follow-up: 2 months  CC:   Scribed By: Winifred Bautista, MS, PAMikeC        Again, thank you for allowing me to participate in the care of your patient.        Sincerely,        Winifred Bautista PA-C

## 2019-02-21 NOTE — NURSING NOTE
"Chief Complaint   Patient presents with     Acne       Initial /63 (BP Location: Left arm, Patient Position: Chair, Cuff Size: Adult Regular)   Pulse 63   SpO2 97%  Estimated body mass index is 23.4 kg/m  as calculated from the following:    Height as of 7/27/18: 1.676 m (5' 6\").    Weight as of 9/24/18: 65.8 kg (145 lb).  Medications and allergies reviewed.    Jaime JARA, ANTHONY    "

## 2019-02-22 NOTE — PROGRESS NOTES
HPI:   Tracee Dominguez is a 18 year old female who presents for acne.  chief complaint  Condition has been present for: several years and is worsening  Pt complains of pain: Yes     Previous treatments include: OTC only  Areas Involved: face  Current Outpatient Medications   Medication Sig Dispense Refill     albuterol (PROAIR HFA/PROVENTIL HFA/VENTOLIN HFA) 108 (90 BASE) MCG/ACT Inhaler Inhale 2 puffs into the lungs every 6 hours as needed for shortness of breath / dyspnea 2 Inhaler 1     doxycycline monohydrate (MONODOX) 100 MG capsule 1 cap PO BID with food and full glass of water 60 capsule 2     norethindrone-ethinyl estradiol (JUNEL FE 1/20) 1-20 MG-MCG tablet Take 1 tablet by mouth daily 84 tablet 3     tretinoin (RETIN-A) 0.025 % external cream Use every night 45 g 11     desogestrel-ethinyl estradiol (APRI) 0.15-30 MG-MCG per tablet Take 1 tablet by mouth daily (Patient not taking: Reported on 2/21/2019) 84 tablet 3     No Known Allergies  Denies any other skin complaints, in general feels well: Yes  Review of symptoms otherwise negative:Yes    PHYSICAL EXAM:   A&Ox3: Yes   Well developed/well nourished female Yes   Mood appropriate Yes      /63 (BP Location: Left arm, Patient Position: Chair, Cuff Size: Adult Regular)   Pulse 63   SpO2 97%   Type 2 skin. Mood appropriate  Alert and Oriented X 3. Well developed, well nourished in no distress.  General appearance: Normal  Head including face: Normal  Eyes: conjunctiva and lids: Normal  Mouth: Lips, teeth, gums: Normal  Neck: Normal  Back: clear  Cardiovascular: Exam of peripheral vascular system by observation for swelling, varicosities, edema: Normal  Extremities: digits/nails (clubbing): Normal  Right upper extremity: Normal  Left upper extremity: Normal  Right lower extremity: Normal  Left lower extremity: Normal  Skin: Scalp and body hair: See below     Comedones Papules/Pustules Cysts Staining Scarring   Face/Neck 2+ macrocomedones on the chin 2+   0 0 0   Chest 0 0 0 0 0   Back 0 0 0 0 0     Telangiectasias: No Fixed Erythema: No Exoriations: No   Other Physical Exam Findings:    ASSESSMENT & PLAN:   1. Acne Vulgaris - advised on diagnosis and treatment options. Discussed use of topical medications and antibiotics. Was on OCPs but felt like she gained weight.   --Start doxycycline 100 mg BID x 3 months. Advised to take with food. Discussed risk of GI upset, esophagitis and photosensitivity.   --Start Loestrin every day. Advised to take at same time every single day. Discussed increased risk of DVT; denies any personal or fhx of coagulopathy; does not smoke. Advised if experiencing any unexplained pain or swelling in legs, CP or SOB to contact clinic immediately.   --Start tretinoin 0.025% cream qd          Pt advised on use and risks including photosensitivity, allergic reactions, GI upset, headaches, nausea, erythema, scaling, vertigo, asthralgias, blood clots:Yes    Follow-up: 2 months  CC:   Scribed By: Winifred Bautista, MS, PAMikeC

## 2019-05-28 ENCOUNTER — DOCUMENTATION ONLY (OUTPATIENT)
Dept: LAB | Facility: CLINIC | Age: 19
End: 2019-05-28

## 2019-05-28 ENCOUNTER — TELEPHONE (OUTPATIENT)
Dept: FAMILY MEDICINE | Facility: CLINIC | Age: 19
End: 2019-05-28

## 2019-05-28 NOTE — PROGRESS NOTES
.Please place or confirm orders for upcoming lab appointment on 5/29/19. (Patient is requesting a TB blood test.)  Thank You  Sherron

## 2019-05-28 NOTE — TELEPHONE ENCOUNTER
The patient called back and wants to have the TB lab blood draw test instead of the Mantoux.  I scheduled a lab appointment on 5/29/19.  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  Tamiko Allred,

## 2019-05-28 NOTE — PROGRESS NOTES
I have not seen her in 3 years. Have her sign up for mychart and do an e visit. Please.     Fatou Gifford MD

## 2019-05-28 NOTE — TELEPHONE ENCOUNTER
I moved the patients appointment to the ancillary schedule.  I called the patient and LM.  My direct line given [phone] to call back and confirm.  Tamiko Allred,

## 2019-05-28 NOTE — PROGRESS NOTES
I called the patient and LM including the providers note below.  I gave her the Monitor My Meds Helpline number [phone] to activate her account and do an e-visit.  Tamiko Allred,

## 2019-05-28 NOTE — TELEPHONE ENCOUNTER
Please call patient and reschedule the Wednesday appointment for the mantoux administration (TB test) from the RN schedule to the ancillary schedule.  No available appointment currently on ancillary schedule at the same time.

## 2019-05-28 NOTE — PROGRESS NOTES
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  Please add/sign an order if okay.  Lab 5/29/19  Tamiko Allred,

## 2019-05-30 NOTE — PROGRESS NOTES
I called the patient again and LM including the providers note below.  I gave her the PPT [employer] Helpline number [phone] to reactivate her [facility] account and do an e-visit.  Clinic number given if any questions.  Tamiko Allred,

## 2019-05-31 ENCOUNTER — OFFICE VISIT (OUTPATIENT)
Dept: URGENT CARE | Facility: URGENT CARE | Age: 19
End: 2019-05-31
Payer: COMMERCIAL

## 2019-05-31 VITALS
WEIGHT: 147 LBS | DIASTOLIC BLOOD PRESSURE: 71 MMHG | BODY MASS INDEX: 23.73 KG/M2 | OXYGEN SATURATION: 100 % | SYSTOLIC BLOOD PRESSURE: 119 MMHG | TEMPERATURE: 98.7 F | HEART RATE: 73 BPM

## 2019-05-31 DIAGNOSIS — Z11.1 TUBERCULOSIS SCREENING: Primary | ICD-10-CM

## 2019-05-31 DIAGNOSIS — J30.2 SEASONAL ALLERGIC RHINITIS, UNSPECIFIED TRIGGER: ICD-10-CM

## 2019-05-31 DIAGNOSIS — J45.20 MILD INTERMITTENT ASTHMA WITHOUT COMPLICATION: ICD-10-CM

## 2019-05-31 PROCEDURE — 86481 TB AG RESPONSE T-CELL SUSP: CPT | Performed by: FAMILY MEDICINE

## 2019-05-31 PROCEDURE — 36415 COLL VENOUS BLD VENIPUNCTURE: CPT | Performed by: FAMILY MEDICINE

## 2019-05-31 PROCEDURE — 99213 OFFICE O/P EST LOW 20 MIN: CPT | Performed by: FAMILY MEDICINE

## 2019-05-31 RX ORDER — ALBUTEROL SULFATE 90 UG/1
2 AEROSOL, METERED RESPIRATORY (INHALATION) EVERY 6 HOURS PRN
Qty: 18 G | Refills: 0 | Status: SHIPPED | OUTPATIENT
Start: 2019-05-31 | End: 2020-04-15

## 2019-05-31 NOTE — PROGRESS NOTES
Chief complaint:  Requesting TB testing for school  Also requesting asthma rescue inhaler - flares up during the spring season    Denies any possibility of pregnancy declined a pregnancy test    Her last day of insurance is today and she was unable to schedule an appointment with primary care provider hence she's here in urgent care  Fever No  Sinus congestion/sinus pain No  Wheezing: No  Chest pain or exertional shortness of breath: NO   Exposure to pertussis or pertussis like symptoms: No  Orthopnea, worsening edema, pnd: NO  Rash: NO  Tried OTC medications without relief  No hemoptysis.  Worsening symptoms hence patient came in to be seen     Problem list and histories reviewed & adjusted, as indicated.  Additional history: as documented    Problem list, Medication list, Allergies, and Medical/Social/Surgical histories reviewed in EPIC and updated as appropriate.    ROS:  Constitutional, HEENT, cardiovascular, pulmonary, gi and gu systems are negative, except as otherwise noted.    OBJECTIVE:                                                    /71   Pulse 73   Temp 98.7  F (37.1  C) (Oral)   Wt 66.7 kg (147 lb)   SpO2 100%   Breastfeeding? No   BMI 23.73 kg/m    Body mass index is 23.73 kg/m .  GENERAL: healthy, alert and no distress  EYES: pink palpebral conjunctiva, anicteric sclera  ENT: midline nasal septum normal ear exam. Normal  sinuses.   Mouth: moist buccal mucosa nonhyperemic posterior pharyngeal wall. No tonsillar enlargement or cellulitis  NECK: no adenopathy, no asymmetry, masses, or scars and thyroid normal to palpation  RESP: lungs clear to auscultation - No  rales, rhonchi or wheezes    CV: regular rate and rhythm, normal S1 S2, no S3 or S4,  No murmurs, click or rub  SKIN: no visible rashes noted  Pscyh: Appropriate mood and affect  MS: no gross musculoskeletal defects noted    Diagnostic Test Results:  none      ASSESSMENT/PLAN:                                                         ICD-10-CM    1. Tuberculosis screening Z11.1 Quantiferon TB Gold Plus   2. Mild intermittent asthma without complication J45.20 albuterol (PROAIR HFA/PROVENTIL HFA/VENTOLIN HFA) 108 (90 Base) MCG/ACT inhaler   3. Seasonal allergic rhinitis, unspecified trigger J30.2 albuterol (PROAIR HFA/PROVENTIL HFA/VENTOLIN HFA) 108 (90 Base) MCG/ACT inhaler     TB screening for school - patient preferred quant gold because insurance runs out today and she did not want a follow up visit  Refilled albuterol per request - recommend follow up with primary care provider   Flared up by seasonal allergies -recommend flonase during spring   Adverse reactions of medications discussed.  Over the counter medications discussed.   Aware to come back in if with worsening symptoms or if no relief despite treatment plan  Patient voiced understanding and had no further questions.     MD Bhakti Fried MD  [facility]

## 2019-05-31 NOTE — PATIENT INSTRUCTIONS
Patient Education     Understanding Asthma    Asthma causes swelling and narrowing of the airways in your lungs. Medical experts are not exactly sure what causes asthma. It is believed to be caused by a mix of inherited and environmental factors.  Healthy lungs  Inside the lungs there are branching airways made of stretchy tissue. Each airway is wrapped with bands of muscle. The airways become more narrow as they go deeper into the lungs. The smallest airways end in clusters of tiny balloon-like air sacs (alveoli). These clusters are surrounded by blood vessels. When you breathe in (inhale), air enters the lungs. It travels down through the airways until it reaches the air sacs. When you breathe out (exhale), air travels up through the airways and out of the lungs. The airways produce mucus that traps particles you breathe in. Normally, the mucus is then swept out of the lungs by tiny hairs (cilia) that line the airways. The mucus is swallowed or coughed up.  What the lungs do  The air you inhale contains oxygen. When oxygen reaches the air sacs, it passes into the blood vessels surrounding the sacs. Your blood then delivers oxygen to all of your cells. As you exhale, carbon dioxide is removed in a similar way from the blood in the air sacs, and from the body.  When you have asthma  People with asthma have very sensitive airways. This means the airways react to certain things called triggers (such as pollen, dust, or smoke) and become swollen and narrowed. Inflammation makes the airways swollen and narrowed. This is a long-lasting (chronic) problem. The airways may not always be narrowed enough to notice breathing problems.  Symptoms of chronic inflammation:     Coughing    Chest tightness    Shortness of breath    Wheezing (a whistling noise, especially when breathing out)    Low energy or feeling tired  In some people, over time chronic mild inflammation can lead to lasting (permanent) scarring of airways and  loss of lung function.  Moderate flare-ups  When sensitive airways are irritated by a trigger, the muscles around the airways tighten. The lining of the airways swells. Thick, sticky mucus increases and partly clogs the airways. All of this makes you work harder to keep breathing.  Symptoms of moderate flare-ups:    Coughing, especially at night    Getting tired or out of breath easily    Wheezing    Chest tightness    Faster breathing when at rest  Severe flare-ups  Severe flare-ups are life-threatening. In a severe flare-up, the muscle tightening, swelling, and mucus production are even worse. It s very hard to breathe. Your body can't get enough oxygen and can't remove carbon dioxide. Waste gas is trapped in the alveoli, and gas exchange can t occur. The body is not getting enough oxygen. Without oxygen, body tissues, especially brain tissue, begin to get damaged. If this goes on for long, it can lead to severe brain damage or death.  Call 911 (or have someone call for you) if you have any of these symptoms and they are not relieved right away by taking your quick-relief medicine as prescribed:    Severe trouble breathing    Too short of breath to talk or walk    Lips or fingers turning blue    Feeling lightheaded or dizzy, as though you are about to pass out    Peak flow less than 50% of your personal best, if you use peak flow monitoring  Asthma is a long-term condition. So it s important to work with your healthcare provider to manage it. If you smoke, get help to quit. Know your triggers and figure out how to avoid them. It s also very important to take your medicines as directed. That means taking them even when you feel good.  Date Last Reviewed: 12/1/2016 2000-2018 [employer]. [address] [place] [address], [place] [address]. All rights reserved. This information is not intended as a substitute for professional medical care. Always follow your healthcare professional's instructions.

## 2019-06-04 LAB
GAMMA INTERFERON BACKGROUND BLD IA-ACNC: 0.03 IU/ML
M TB IFN-G BLD-IMP: NEGATIVE
M TB IFN-G CD4+ BCKGRND COR BLD-ACNC: >10 IU/ML
MITOGEN IGNF BCKGRD COR BLD-ACNC: 0 IU/ML
MITOGEN IGNF BCKGRD COR BLD-ACNC: 0.01 IU/ML

## 2020-01-29 ENCOUNTER — OFFICE VISIT (OUTPATIENT)
Dept: FAMILY MEDICINE | Facility: CLINIC | Age: 20
End: 2020-01-29
Payer: COMMERCIAL

## 2020-01-29 VITALS
HEART RATE: 77 BPM | OXYGEN SATURATION: 98 % | TEMPERATURE: 98.3 F | SYSTOLIC BLOOD PRESSURE: 106 MMHG | WEIGHT: 178 LBS | DIASTOLIC BLOOD PRESSURE: 64 MMHG | BODY MASS INDEX: 28.73 KG/M2

## 2020-01-29 DIAGNOSIS — N89.8 VAGINAL DISCHARGE: Primary | ICD-10-CM

## 2020-01-29 LAB
HCG UR QL: NEGATIVE
SPECIMEN SOURCE: NORMAL
WET PREP SPEC: NORMAL

## 2020-01-29 PROCEDURE — 81025 URINE PREGNANCY TEST: CPT | Performed by: NURSE PRACTITIONER

## 2020-01-29 PROCEDURE — 87591 N.GONORRHOEAE DNA AMP PROB: CPT | Performed by: NURSE PRACTITIONER

## 2020-01-29 PROCEDURE — 87210 SMEAR WET MOUNT SALINE/INK: CPT | Performed by: NURSE PRACTITIONER

## 2020-01-29 PROCEDURE — 87491 CHLMYD TRACH DNA AMP PROBE: CPT | Performed by: NURSE PRACTITIONER

## 2020-01-29 PROCEDURE — 99213 OFFICE O/P EST LOW 20 MIN: CPT | Performed by: NURSE PRACTITIONER

## 2020-01-29 RX ORDER — METRONIDAZOLE 500 MG/1
500 TABLET ORAL 2 TIMES DAILY
Qty: 14 TABLET | Refills: 0 | Status: SHIPPED | OUTPATIENT
Start: 2020-01-29 | End: 2020-02-10

## 2020-01-29 ASSESSMENT — ENCOUNTER SYMPTOMS
DIARRHEA: 0
FLANK PAIN: 0
FREQUENCY: 0
NAUSEA: 0
ABDOMINAL PAIN: 0
VOMITING: 0
DYSURIA: 0
HEMATURIA: 0

## 2020-01-29 NOTE — PATIENT INSTRUCTIONS
You likely have a bacterial vaginitis.  1. Take medication as prescribed for the full course.  2. Take a probiotic and/or eat yogurt daily while on antibiotics and 10 days after to prevent GI upset.  3. If symptoms worsen or do not improve within 1 week, please follow-up with your PCP.    We will call you with the chlamydia and gonorrhea results.     Pregnancy test was negative. If you do not get your period in the next few weeks, please take another pregnancy test or follow-up in clinic.

## 2020-01-29 NOTE — PROGRESS NOTES
Subjective     Tracee Dominguez is a 19 year old female who presents to clinic today for the following health issues:    HPI   Vaginal Symptoms      Duration: 3 days    Description  vaginal discharge - white curd-like, itching and odor    Intensity:  moderate    Accompanying signs and symptoms (fever/dysuria/abdominal or back pain): None    History  Sexually active: yes, multiple partners, contraception - none  Possibility of pregnancy: Yes  Recent antibiotic use: no     Precipitating or alleviating factors: None    Therapies tried and outcome: none   Outcome: NA    In addition: 1 month ago she had vaginal itching, discharge, and odor. She didn't have insurance so mom gave her left over abx - symptoms went away. 3-4 days ago symptoms returned: itching, discharge (white), odor. New partner within last 3 months. States her partner is clean. Has had unprotected sex since last period. Denies sore throat, pelvic pain. LMP was 6 weeks ago. States she is often irregular.      Patient Active Problem List   Diagnosis     Intermittent asthma     Hyperhidrosis     Wart     Birth control     Dyspareunia     Past Surgical History:   Procedure Laterality Date     ENT SURGERY      broke jaw in      NO HISTORY OF SURGERY       REMOVE FOREIGN BODY UPPER EXTREMITY N/A 6/28/2017    Procedure: REMOVE FOREIGN BODY UPPER EXTREMITY;  Removal of Nexplanon;  Surgeon: Al Stratton MD;  Location:  OR       Social History     Tobacco Use     Smoking status: Former Smoker     Packs/day: 0.25     Types: Cigarettes     Smokeless tobacco: Never Used     Tobacco comment: 1 cig a day   Substance Use Topics     Alcohol use: No     Family History   Problem Relation Age of Onset     Thyroid Disease Mother      Depression Mother      Anxiety Disorder Mother      Depression Father      Anxiety Disorder Father          Current Outpatient Medications   Medication Sig Dispense Refill     albuterol (PROAIR HFA/PROVENTIL HFA/VENTOLIN  HFA) 108 (90 Base) MCG/ACT inhaler Inhale 2 puffs into the lungs every 6 hours as needed for shortness of breath / dyspnea or wheezing 18 g 0     No Known Allergies  BP Readings from Last 3 Encounters:   01/29/20 106/64   05/31/19 119/71   02/21/19 112/63    Wt Readings from Last 3 Encounters:   01/29/20 80.7 kg (178 lb)   05/31/19 66.7 kg (147 lb)   09/24/18 65.8 kg (145 lb)                      Reviewed and updated as needed this visit by Provider         Review of Systems   Gastrointestinal: Negative for abdominal pain, diarrhea, nausea and vomiting.   Genitourinary: Negative for dysuria, flank pain, frequency, genital sores, hematuria, pelvic pain and urgency.            Objective    /64 (BP Location: Left arm, Cuff Size: Adult Large)   Pulse 77   Temp 98.3  F (36.8  C) (Tympanic)   Wt 80.7 kg (178 lb)   LMP 12/09/2019 (Approximate)   SpO2 98%   BMI 28.73 kg/m    Body mass index is 28.73 kg/m .  Physical Exam  Vitals signs and nursing note reviewed.   Constitutional:       Appearance: She is not toxic-appearing.   Cardiovascular:      Rate and Rhythm: Normal rate and regular rhythm.      Pulses: Normal pulses.      Heart sounds: Normal heart sounds.   Pulmonary:      Effort: Pulmonary effort is normal.      Breath sounds: Normal breath sounds.   Abdominal:      General: Abdomen is flat.      Palpations: Abdomen is soft.      Tenderness: There is no abdominal tenderness.   Skin:     General: Skin is warm and dry.   Neurological:      Mental Status: She is alert and oriented to person, place, and time.            Diagnostic Test Results:  Labs reviewed in Epic  Results for orders placed or performed in visit on 01/29/20 (from the past 24 hour(s))   Wet prep   Result Value Ref [place]    Specimen Description Vagina     Wet Prep No clue cells seen     Wet Prep No yeast seen     Wet Prep No Trichomonas seen     Wet Prep Few  WBC'S seen      [place] [facility] [place]  ([id number])   Result Value  Ref Range    HCG Qual Urine Negative NEG^Negative           Assessment & Plan     1. Vaginal discharge  Suspect bacterial vaginitis given symptoms. Wet prep negative. Negative pregnancy test. Awaiting gonorrhea and chlamydia results. Flagyl prescribed for suspected bacterial vaginitis. Discussed avoiding ETOH while on flagyl. Will call with other results.     - Wet prep  - HCG Qual, [place] [facility] [place]  ([id number])  - Neisseria gonorrhoeae PCR  - Chlamydia trachomatis PCR  - metroNIDAZOLE (FLAGYL) 500 MG tablet; Take 1 tablet (500 mg) by mouth 2 times daily for 7 days  Dispense: 14 tablet; Refill: 0       Return in about 1 week (around 2/5/2020), or if symptoms worsen or fail to improve.    DEREK Pierre [facility]

## 2020-02-06 ENCOUNTER — TELEPHONE (OUTPATIENT)
Dept: FAMILY MEDICINE | Facility: CLINIC | Age: 20
End: 2020-02-06

## 2020-02-10 ENCOUNTER — OFFICE VISIT (OUTPATIENT)
Dept: FAMILY MEDICINE | Facility: CLINIC | Age: 20
End: 2020-02-10
Payer: COMMERCIAL

## 2020-02-10 VITALS
SYSTOLIC BLOOD PRESSURE: 98 MMHG | HEIGHT: 66 IN | TEMPERATURE: 97.5 F | HEART RATE: 56 BPM | BODY MASS INDEX: 28.54 KG/M2 | RESPIRATION RATE: 12 BRPM | WEIGHT: 177.6 LBS | DIASTOLIC BLOOD PRESSURE: 60 MMHG

## 2020-02-10 DIAGNOSIS — Z32.00 POSSIBLE PREGNANCY, NOT CONFIRMED: Primary | ICD-10-CM

## 2020-02-10 DIAGNOSIS — Z30.011 ENCOUNTER FOR INITIAL PRESCRIPTION OF CONTRACEPTIVE PILLS: ICD-10-CM

## 2020-02-10 LAB
HCG SERPL QL: NEGATIVE
HCG UR QL: NEGATIVE

## 2020-02-10 PROCEDURE — 81025 URINE PREGNANCY TEST: CPT | Performed by: NURSE PRACTITIONER

## 2020-02-10 PROCEDURE — 99213 OFFICE O/P EST LOW 20 MIN: CPT | Performed by: NURSE PRACTITIONER

## 2020-02-10 PROCEDURE — 84703 CHORIONIC GONADOTROPIN ASSAY: CPT | Performed by: NURSE PRACTITIONER

## 2020-02-10 RX ORDER — NORETHINDRONE ACETATE AND ETHINYL ESTRADIOL 1MG-20(21)
1 KIT ORAL DAILY
Qty: 90 TABLET | Refills: 3 | Status: SHIPPED | OUTPATIENT
Start: 2020-02-10 | End: 2020-09-25

## 2020-02-10 ASSESSMENT — ENCOUNTER SYMPTOMS
COUGH: 0
RHINORRHEA: 0
CHILLS: 0
SHORTNESS OF BREATH: 0
CONSTIPATION: 0
SORE THROAT: 0
SINUS PRESSURE: 0
DIARRHEA: 0
EYE ITCHING: 0
HEADACHES: 0
FEVER: 0
DIAPHORESIS: 0
FATIGUE: 0
NAUSEA: 0
LIGHT-HEADEDNESS: 0
DIZZINESS: 0
EYE DISCHARGE: 0
WHEEZING: 0

## 2020-02-10 ASSESSMENT — MIFFLIN-ST. JEOR: SCORE: 1579.4

## 2020-02-10 ASSESSMENT — PAIN SCALES - GENERAL: PAINLEVEL: NO PAIN (0)

## 2020-02-10 NOTE — PROGRESS NOTES
"Jameel Dominguez is a 19 year old female who presents to clinic today for the following health issues:    HPI     Chief Complaint   Patient presents with     Pregnancy Test     LMP approximately 12/9/2019. Multiple negative pregnancy tests at home. Had negative test in clinic 1/29/2020 as well. Still concerned she could be pregnant. Requesting blood test if today's urine test is still negative. Is interested in starting birth control pills. Admits she is not consistent with using condoms.     Current Outpatient Medications   Medication Sig Dispense Refill     albuterol (PROAIR HFA/PROVENTIL HFA/VENTOLIN HFA) 108 (90 Base) MCG/ACT inhaler Inhale 2 puffs into the lungs every 6 hours as needed for shortness of breath / dyspnea or wheezing 18 g 0     No Known Allergies    Reviewed and updated as needed this visit by Provider       Periods are typically regular. Has missed last 2 periods. Is having some cramping like could get period. Took 2 pregnancy test at home that was negative. Had lab in clinic that was negative. Is not consistent with condomes. Had been on birth contol in that past, oral pill. That did work for her, but was not able to continue using due to no insurance. Has 1 partner. Does feel more tired. No breast tenderness. No nausea. No urinary frequency. Has had nexplanon in the past. Has been on depo in the past as well. Did not like either of those forms and would like to go back on pills.       Objective    BP 98/60 (BP Location: Left arm, Patient Position: Sitting, Cuff Size: Adult Regular)   Pulse 56   Temp 97.5  F (36.4  C) (Tympanic)   Resp 12   Ht 1.664 m (5' 5.5\")   Wt 80.6 kg (177 lb 9.6 oz)   LMP 12/09/2019   BMI 29.10 kg/m    Body mass index is 29.1 kg/m .        Assessment & Plan      Review of Systems   Constitutional: Negative for chills, diaphoresis, fatigue and fever.   HENT: Negative for ear discharge, ear pain, hearing loss, rhinorrhea, sinus pressure and sore throat.  "   Eyes: Negative for discharge and itching.   Respiratory: Negative for cough, shortness of breath and wheezing.    Gastrointestinal: Negative for constipation, diarrhea and nausea.   Genitourinary:        Missed period    Skin: Negative for rash.   Neurological: Negative for dizziness, light-headedness and headaches.       Physical Exam  Constitutional:       Appearance: She is well-developed.   Cardiovascular:      Rate and Rhythm: Normal rate and regular rhythm.   Pulmonary:      Effort: Pulmonary effort is normal.      Breath sounds: Normal breath sounds.   Abdominal:      General: Abdomen is flat. There is no distension.      Palpations: Abdomen is soft.      Tenderness: There is no abdominal tenderness.   Skin:     General: Skin is warm.   Neurological:      Mental Status: She is alert.         1. Possible pregnancy, not confirmed  We will check serum hCG.  We will plan to start on oral contraceptive Junel Fe 1/20 if lab is negative.   Discussed options   Risks and benefits discussed  Would like to start on oral birth control pills  Educated on how to start, missed dose  Continue to use condoms   Informed may have some irregular bleeding for the first 3 months  Use back up form of birth control for 1 month after first start  - HCG qualitative urine  - HCG qualitative       Return in about 2 weeks (around 2/24/2020) for pap.    DEREK Tate [facility]

## 2020-02-20 ENCOUNTER — OFFICE VISIT (OUTPATIENT)
Dept: FAMILY MEDICINE | Facility: CLINIC | Age: 20
End: 2020-02-20
Payer: COMMERCIAL

## 2020-02-20 VITALS
TEMPERATURE: 96.8 F | WEIGHT: 178 LBS | BODY MASS INDEX: 29.17 KG/M2 | DIASTOLIC BLOOD PRESSURE: 60 MMHG | SYSTOLIC BLOOD PRESSURE: 102 MMHG | RESPIRATION RATE: 16 BRPM | HEART RATE: 56 BPM

## 2020-02-20 DIAGNOSIS — N89.8 VAGINAL DISCHARGE: Primary | ICD-10-CM

## 2020-02-20 DIAGNOSIS — N92.6 IRREGULAR MENSES: ICD-10-CM

## 2020-02-20 DIAGNOSIS — Z11.3 SCREEN FOR STD (SEXUALLY TRANSMITTED DISEASE): ICD-10-CM

## 2020-02-20 LAB
ALBUMIN UR-MCNC: NEGATIVE MG/DL
ANION GAP SERPL CALCULATED.3IONS-SCNC: 1 MMOL/L (ref 3–14)
APPEARANCE UR: CLEAR
BACTERIA #/AREA URNS HPF: ABNORMAL /HPF
BASOPHILS # BLD AUTO: 0 10E9/L (ref 0–0.2)
BASOPHILS NFR BLD AUTO: 0.1 %
BILIRUB UR QL STRIP: NEGATIVE
BUN SERPL-MCNC: 16 MG/DL (ref 7–30)
CALCIUM SERPL-MCNC: 8.9 MG/DL (ref 8.5–10.1)
CHLORIDE SERPL-SCNC: 103 MMOL/L (ref 94–109)
CO2 SERPL-SCNC: 31 MMOL/L (ref 20–32)
COLOR UR AUTO: YELLOW
CREAT SERPL-MCNC: 0.74 MG/DL (ref 0.52–1.04)
DIFFERENTIAL METHOD BLD: NORMAL
EOSINOPHIL # BLD AUTO: 0.2 10E9/L (ref 0–0.7)
EOSINOPHIL NFR BLD AUTO: 2.6 %
ERYTHROCYTE [DISTWIDTH] IN BLOOD BY AUTOMATED COUNT: 12.8 % (ref 10–15)
ESTRADIOL SERPL-MCNC: 71 PG/ML
FSH SERPL-ACNC: 5.6 IU/L
GFR SERPL CREATININE-BSD FRML MDRD: >90 ML/MIN/{1.73_M2}
GLUCOSE SERPL-MCNC: 92 MG/DL (ref 70–99)
GLUCOSE UR STRIP-MCNC: NEGATIVE MG/DL
HCT VFR BLD AUTO: 40.8 % (ref 35–47)
HGB BLD-MCNC: 13.4 G/DL (ref 11.7–15.7)
HGB UR QL STRIP: NEGATIVE
KETONES UR STRIP-MCNC: NEGATIVE MG/DL
LEUKOCYTE ESTERASE UR QL STRIP: ABNORMAL
LYMPHOCYTES # BLD AUTO: 2.8 10E9/L (ref 0.8–5.3)
LYMPHOCYTES NFR BLD AUTO: 37.1 %
MCH RBC QN AUTO: 28.4 PG (ref 26.5–33)
MCHC RBC AUTO-ENTMCNC: 32.8 G/DL (ref 31.5–36.5)
MCV RBC AUTO: 86 FL (ref 78–100)
MONOCYTES # BLD AUTO: 0.4 10E9/L (ref 0–1.3)
MONOCYTES NFR BLD AUTO: 5.7 %
NEUTROPHILS # BLD AUTO: 4.1 10E9/L (ref 1.6–8.3)
NEUTROPHILS NFR BLD AUTO: 54.5 %
NITRATE UR QL: NEGATIVE
NON-SQ EPI CELLS #/AREA URNS LPF: ABNORMAL /LPF
PH UR STRIP: 6 PH (ref 5–7)
PLATELET # BLD AUTO: 235 10E9/L (ref 150–450)
POTASSIUM SERPL-SCNC: 3.9 MMOL/L (ref 3.4–5.3)
RBC # BLD AUTO: 4.72 10E12/L (ref 3.8–5.2)
RBC #/AREA URNS AUTO: ABNORMAL /HPF
SODIUM SERPL-SCNC: 135 MMOL/L (ref 133–144)
SOURCE: ABNORMAL
SP GR UR STRIP: >1.03 (ref 1–1.03)
SPECIMEN SOURCE: NORMAL
TSH SERPL DL<=0.005 MIU/L-ACNC: 1.44 MU/L (ref 0.4–4)
UROBILINOGEN UR STRIP-ACNC: 0.2 EU/DL (ref 0.2–1)
WBC # BLD AUTO: 7.4 10E9/L (ref 4–11)
WBC #/AREA URNS AUTO: ABNORMAL /HPF
WET PREP SPEC: NORMAL

## 2020-02-20 PROCEDURE — 84443 ASSAY THYROID STIM HORMONE: CPT | Performed by: NURSE PRACTITIONER

## 2020-02-20 PROCEDURE — 85025 COMPLETE CBC W/AUTO DIFF WBC: CPT | Performed by: NURSE PRACTITIONER

## 2020-02-20 PROCEDURE — 83520 IMMUNOASSAY QUANT NOS NONAB: CPT | Mod: 90 | Performed by: NURSE PRACTITIONER

## 2020-02-20 PROCEDURE — 86706 HEP B SURFACE ANTIBODY: CPT | Performed by: NURSE PRACTITIONER

## 2020-02-20 PROCEDURE — 81001 URINALYSIS AUTO W/SCOPE: CPT | Performed by: NURSE PRACTITIONER

## 2020-02-20 PROCEDURE — 83001 ASSAY OF GONADOTROPIN (FSH): CPT | Performed by: NURSE PRACTITIONER

## 2020-02-20 PROCEDURE — 87340 HEPATITIS B SURFACE AG IA: CPT | Performed by: NURSE PRACTITIONER

## 2020-02-20 PROCEDURE — 80048 BASIC METABOLIC PNL TOTAL CA: CPT | Performed by: NURSE PRACTITIONER

## 2020-02-20 PROCEDURE — 86696 HERPES SIMPLEX TYPE 2 TEST: CPT | Performed by: NURSE PRACTITIONER

## 2020-02-20 PROCEDURE — 99214 OFFICE O/P EST MOD 30 MIN: CPT | Performed by: NURSE PRACTITIONER

## 2020-02-20 PROCEDURE — 82670 ASSAY OF TOTAL ESTRADIOL: CPT | Performed by: NURSE PRACTITIONER

## 2020-02-20 PROCEDURE — 36415 COLL VENOUS BLD VENIPUNCTURE: CPT | Performed by: NURSE PRACTITIONER

## 2020-02-20 PROCEDURE — 87210 SMEAR WET MOUNT SALINE/INK: CPT | Performed by: NURSE PRACTITIONER

## 2020-02-20 PROCEDURE — 99000 SPECIMEN HANDLING OFFICE-LAB: CPT | Performed by: NURSE PRACTITIONER

## 2020-02-20 PROCEDURE — 86695 HERPES SIMPLEX TYPE 1 TEST: CPT | Performed by: NURSE PRACTITIONER

## 2020-02-20 PROCEDURE — 86803 HEPATITIS C AB TEST: CPT | Performed by: NURSE PRACTITIONER

## 2020-02-20 PROCEDURE — 86780 TREPONEMA PALLIDUM: CPT | Performed by: NURSE PRACTITIONER

## 2020-02-20 PROCEDURE — 87389 HIV-1 AG W/HIV-1&-2 AB AG IA: CPT | Performed by: NURSE PRACTITIONER

## 2020-02-20 ASSESSMENT — ENCOUNTER SYMPTOMS
DIFFICULTY URINATING: 0
NERVOUS/ANXIOUS: 0
DYSPHORIC MOOD: 0
POLYPHAGIA: 0
NERVOUS/ANXIOUS: 1
FREQUENCY: 0
NUMBNESS: 0
DYSURIA: 1
POLYDIPSIA: 0
ABDOMINAL PAIN: 0
DIZZINESS: 0
LIGHT-HEADEDNESS: 0
CONSTIPATION: 0
FATIGUE: 0
RHINORRHEA: 0
CHEST TIGHTNESS: 0
DIARRHEA: 0
HEADACHES: 0
ARTHRALGIAS: 0
SORE THROAT: 0
GASTROINTESTINAL NEGATIVE: 1
SHORTNESS OF BREATH: 0
ACTIVITY CHANGE: 0
NAUSEA: 0
MUSCULOSKELETAL NEGATIVE: 1
PALPITATIONS: 0
WHEEZING: 0
SLEEP DISTURBANCE: 0
COUGH: 0
VOMITING: 0
ROS SKIN COMMENTS: SELF BREAST EXAM WITHOUT AREA OF CONCERN.
CONSTITUTIONAL NEGATIVE: 1
ABDOMINAL DISTENTION: 0

## 2020-02-20 ASSESSMENT — PAIN SCALES - GENERAL: PAINLEVEL: NO PAIN (0)

## 2020-02-20 NOTE — PROGRESS NOTES
Subjective     Tracee Dominguez is a 19 year old female who presents to clinic today for the following health issues:    HPI      Vaginal Symptoms      Duration: 3 days    Description  itching and pain with intercourse    Intensity:  severe    Accompanying signs and symptoms (fever/dysuria/abdominal or back pain): some discomfort with urination    History  Sexually active: yes, same partner for the last 2 years, contraception - condoms   Possibility of pregnancy: No  Recent antibiotic use: YES    Precipitating or alleviating factors: None    Therapies tried and outcome: none   Outcome: None      Current Outpatient Medications   Medication Sig Dispense Refill     albuterol (PROAIR HFA/PROVENTIL HFA/VENTOLIN HFA) 108 (90 Base) MCG/ACT inhaler Inhale 2 puffs into the lungs every 6 hours as needed for shortness of breath / dyspnea or wheezing 18 g 0     norethindrone-ethinyl estradiol (JUNEL FE 1/20) 1-20 MG-MCG tablet Take 1 tablet by mouth daily (Patient not taking: Reported on 2/20/2020) 90 tablet 3     No Known Allergies    Reviewed and updated as needed this visit by Provider         For the last 3 days has been having vaginal discharge and itching.  Is sexually active.  Same partner for the last 2 years.  Was recently seen in clinic due to not having periods for the last 2 months.  New prescription was sent for oral contraceptive.  Pregnancy test was negative at that time.  Had recent STI testing in January 2020 that was negative.  [place] was painful 2 days ago.  No home over-the-counter treatments.  Declines using bath bombs, bubble baths or scented laundry detergents.  Reports has been really trying to lose weight.  Has been working out more and has decreased calories that is taking in      Objective    /60 (BP Location: Left arm, Patient Position: Sitting, Cuff Size: Adult Regular)   Pulse 56   Temp 96.8  F (36  C) (Tympanic)   Resp 16   Wt 80.7 kg (178 lb)   LMP 12/09/2019 (Approximate)   BMI  29.17 kg/m    Body mass index is 29.17 kg/m .            Assessment & Plan      Review of Systems   Constitutional: Negative for activity change and fatigue.   HENT: Negative for ear pain, rhinorrhea and sore throat.    Respiratory: Negative for cough, chest tightness, shortness of breath and wheezing.    Cardiovascular: Negative for chest pain, palpitations and leg swelling.   Gastrointestinal: Negative for abdominal distention, abdominal pain, constipation, diarrhea, nausea and vomiting.   Endocrine: Negative for cold intolerance, heat intolerance, polydipsia, polyphagia and polyuria.   Genitourinary: Positive for dyspareunia and dysuria. Negative for difficulty urinating, enuresis, frequency and urgency.        Vaginal itching  Vaginal discharge    Musculoskeletal: Negative for arthralgias.   Skin: Negative for rash.        Self breast exam without area of concern.    Neurological: Negative for dizziness, light-headedness, numbness and headaches.   Psychiatric/Behavioral: Negative for dysphoric mood and sleep disturbance. The patient is not nervous/anxious.        Physical Exam  Constitutional:       Appearance: She is well-developed.   Cardiovascular:      Rate and Rhythm: Normal rate and regular rhythm.   Pulmonary:      Effort: Pulmonary effort is normal.      Breath sounds: Normal breath sounds.   Abdominal:      Tenderness: There is abdominal tenderness in the right lower quadrant and left lower quadrant. There is no guarding or rebound.   Genitourinary:      Skin:     General: Skin is warm.   Neurological:      Mental Status: She is alert.           1. Vaginal discharge  - UA reflex to Microscopic and Culture  - Wet prep  - Urine Microscopic  We will plan to treat for a vaginal yeast infection based on physical exam findings.  Encouraged to use over-the-counter 7-day Monistat.    2. Screen for STD (sexually transmitted disease)- Hepatitis B Surface Antibody  - Hepatitis B surface antigen  - Hepatitis C  Screen Reflex to HCV RNA Quant and Genotype  - Herpes Simplex Virus 1 and 2 IgG  - HIV Antigen Antibody Combo  - Treponema Abs w Reflex to RPR and Titer    3. Irregular menses  We will check hormone levels here today per request.  We will plan to set up for ultrasound.  Encouraged to start oral contraception.  - Estradiol  - Follicle stimulating hormone  - Anti-Mullerian hormone  - Basic metabolic panel  - CBC with platelets differential  - TSH with free T4 reflex  - US Pelvic Complete w Transvaginal; Future      Return in about 1 week (around 2/27/2020) for A Physical Exam.    DEREK Tate [facility]

## 2020-02-20 NOTE — PROGRESS NOTES
Subjective     Tracee Dominguez is a 19 year old female who presents to clinic today for the following health issues:    Vaginal itching and increase in discharge over the past two days.  Tracee finished an antibiotic about one week ago for treatment of bacterial vaginosis.  She has not had any pain with urination until today, reports mild discomfort with urination when leaving a sample in clinic.  Also had discomfort a few days ago during intercourse with her partner.  Tracee is not currently on birth control, however would like to start.  She has not had a menstrual period for about two months, but has had several negative pregnancy tests including one from 2/10/20.  She and her partner have been using condoms with every intercourse for the past month.  Tracee is not having any pelvic pain or cramping, and the most bothersome symptom is the intense itching.          -------------------------------------    No Known Allergies    Reviewed and updated as needed this visit by Provider         Review of Systems   Constitutional: Negative.    Gastrointestinal: Negative.    Genitourinary: Positive for dysuria, menstrual problem and vaginal discharge.   Musculoskeletal: Negative.    Skin: Negative.    Psychiatric/Behavioral: The patient is nervous/anxious.         Objective    /60 (BP Location: Left arm, Patient Position: Sitting, Cuff Size: Adult Regular)   Pulse 56   Temp 96.8  F (36  C) (Tympanic)   Resp 16   Wt 80.7 kg (178 lb)   LMP 12/09/2019 (Approximate)   BMI 29.17 kg/m    Body mass index is 29.17 kg/m .       Physical Exam  Vitals signs reviewed.   Constitutional:       Appearance: Normal appearance. She is well-developed. She is not ill-appearing.   Cardiovascular:      Rate and Rhythm: Normal rate and regular rhythm.      Heart sounds: Normal heart sounds, S1 normal and S2 normal.   Pulmonary:      Effort: Pulmonary effort is normal.      Breath sounds: Normal breath sounds.   Abdominal:      General:  "Bowel sounds are normal.      Palpations: Abdomen is soft.      Tenderness: There is abdominal tenderness in the right lower quadrant and left lower quadrant. There is no right CVA tenderness or left CVA tenderness.   Genitourinary:     General: Normal vulva.      Pubic Area: No rash.       Labia:         Right: No rash.         Left: No rash.       Vagina: Vaginal discharge present.   Skin:     General: Skin is warm and dry.      Capillary Refill: Capillary refill takes less than 2 seconds.      Findings: No rash.   Neurological:      Mental Status: She is alert.   Psychiatric:         Behavior: Behavior is cooperative.         Diagnostic Test Results:  Labs reviewed in Epic  none         Assessment & Plan     1. Vaginal discharge  Over the counter 7 day Monistat cream.  Wear cotton underwear, use unfragranced soaps and laundry detergents.    - UA reflex to Microscopic and Culture  - Wet prep  - Urine Microscopic    2. Screen for STD (sexually transmitted disease)  Additional STI screening done today because of vaginal itching with no source.     - Hepatitis B Surface Antibody  - Hepatitis B surface antigen  - Hepatitis C Screen Reflex to HCV RNA Quant and Genotype  - Herpes Simplex Virus 1 and 2 IgG  - HIV Antigen Antibody Combo  - Treponema Abs w Reflex to RPR and Titer    3. Irregular menses  Plan for ultrasound to evaluate for irregular menses.  Hormone levels checked as well.  Okay to start on birth control pills.     - Estradiol  - Follicle stimulating hormone  - Anti-Mullerian hormone  - Basic metabolic panel  - CBC with platelets differential  - TSH with free T4 reflex  - US Pelvic Complete w Transvaginal; Future     BMI:   Estimated body mass index is 29.17 kg/m  as calculated from the following:    Height as of 2/10/20: 1.664 m (5' 5.5\").    Weight as of this encounter: 80.7 kg (178 lb).       Regular exercise    Magalie De Souza on 2/20/2020 at 12:18 PM      Return in about 1 week (around 2/27/2020) " for A Physical Exam.    DEREK Tate [facility]

## 2020-02-20 NOTE — LETTER
My Asthma Action Plan    Name: Tracee Dominguez   YOB: 2000  Date: 2/20/2020   My doctor: DEREK Tate CNP   My clinic: [facility]        My Rescue Medicine:   Albuterol inhaler (Proair/Ventolin/Proventil HFA)  2-4 puffs EVERY 4 HOURS as needed. Use a spacer if recommended by your provider.   My Asthma Severity:   Intermittent / Exercise Induced  Know your asthma triggers:              GREEN ZONE   Good Control    I feel good    No cough or wheeze    Can work, sleep and play without asthma symptoms       Take your asthma control medicine every day.     1. If exercise triggers your asthma, take your rescue medication    15 minutes before exercise or sports, and    During exercise if you have asthma symptoms  2. Spacer to use with inhaler: If you have a spacer, make sure to use it with your inhaler             YELLOW ZONE Getting Worse  I have ANY of these:    I do not feel good    Cough or wheeze    Chest feels tight    Wake up at night   1. Keep taking your Green Zone medications  2. Start taking your rescue medicine:    every 20 minutes for up to 1 hour. Then every 4 hours for 24-48 hours.  3. If you stay in the Yellow Zone for more than 12-24 hours, contact your doctor.  4. If you do not return to the Green Zone in 12-24 hours or you get worse, start taking your oral steroid medicine if prescribed by your provider.           RED ZONE Medical Alert - Get Help  I have ANY of these:    I feel awful    Medicine is not helping    Breathing getting harder    Trouble walking or talking    Nose opens wide to breathe       1. Take your rescue medicine NOW  2. If your provider has prescribed an oral steroid medicine, start taking it NOW  3. Call your doctor NOW  4. If you are still in the Red Zone after 20 minutes and you have not reached your doctor:    Take your rescue medicine again and    Call 911 or go to the emergency room right away    See your regular doctor within 2 weeks of  an Emergency Room or Urgent Care visit for follow-up treatment.          Annual Reminders:  Meet with Asthma Educator,  Flu Shot in the Fall, consider Pneumonia Vaccination for patients with asthma (aged 19 and older).    Pharmacy:    [id number] - [place], [place] - [address] [facility] [address] [address] [employer] - [place], [place] - [address]  [facility] [address] [place] [facility] [place], [place] - [address]    Electronically signed by DEREK Tate CNP   Date: 02/20/20                    Asthma Triggers  How To Control Things That Make Your Asthma Worse    Triggers are things that make your asthma worse.  Look at the list below to help you find your triggers and   what you can do about them. You can help prevent asthma flare-ups by staying away from your triggers.      Trigger                                                          What you can do   Cigarette Smoke  Tobacco smoke can make asthma worse. Do not allow smoking in your home, car or around you.  Be sure no one smokes at a child s day care or school.  If you smoke, ask your health care provider for ways to help you quit.  Ask family members to quit too.  Ask your health care provider for a referral to Quit Plan to help you quit smoking, or call 4-700-938-PLAN.     Colds, Flu, Bronchitis  These are common triggers of asthma. Wash your hands often.  Don t touch your eyes, nose or mouth.  Get a flu shot every year.     Dust Mites  These are tiny bugs that live in cloth or carpet. They are too small to see. Wash sheets and blankets in hot water every week.   Encase pillows and mattress in dust mite proof covers.  Avoid having carpet if you can. If you have carpet, vacuum weekly.   Use a dust mask and HEPA vacuum.   Pollen and Outdoor Mold  Some people are allergic to trees, grass, or weed pollen, or molds. Try to keep your windows closed.  Limit time out doors when pollen count is high.   Ask you health care provider about taking medicine during  allergy season.     Animal Dander  Some people are allergic to skin flakes, urine or saliva from pets with fur or feathers. Keep pets with fur or feathers out of your home.    If you can t keep the pet outdoors, then keep the pet out of your bedroom.  Keep the bedroom door closed.  Keep pets off cloth furniture and away from stuffed toys.     Mice, Rats, and Cockroaches  Some people are allergic to the waste from these pests.   Cover food and garbage.  Clean up spills and food crumbs.  Store grease in the refrigerator.   Keep food out of the bedroom.   Indoor Mold  This can be a trigger if your home has high moisture. Fix leaking faucets, pipes, or other sources of water.   Clean moldy surfaces.  Dehumidify basement if it is damp and smelly.   Smoke, Strong Odors, and Sprays  These can reduce air quality. Stay away from strong odors and sprays, such as perfume, powder, hair spray, paints, smoke incense, paint, cleaning products, candles and new carpet.   Exercise or Sports  Some people with asthma have this trigger. Be active!  Ask your doctor about taking medicine before sports or exercise to prevent symptoms.    Warm up for 5-10 minutes before and after sports or exercise.     Other Triggers of Asthma  Cold air:  Cover your nose and mouth with a scarf.  Sometimes laughing or crying can be a trigger.  Some medicines and food can trigger asthma.

## 2020-02-20 NOTE — PATIENT INSTRUCTIONS
Start using over the counter 7 day Monistat treatment. Notify if no improvement.     Prescription previously sent for oral birth control pills.

## 2020-02-21 LAB
HBV SURFACE AB SERPL IA-ACNC: 6.86 M[IU]/ML
HBV SURFACE AG SERPL QL IA: NONREACTIVE
HCV AB SERPL QL IA: NONREACTIVE
HIV 1+2 AB+HIV1 P24 AG SERPL QL IA: NONREACTIVE
HSV1 IGG SERPL QL IA: >8 AI (ref 0–0.8)
HSV2 IGG SERPL QL IA: <0.2 AI (ref 0–0.8)
T PALLIDUM AB SER QL: NONREACTIVE

## 2020-02-21 ASSESSMENT — ASTHMA QUESTIONNAIRES: ACT_TOTALSCORE: 24

## 2020-02-22 LAB — MIS SERPL-MCNC: 9.95 NG/ML (ref 0.4–16.02)

## 2020-02-23 ENCOUNTER — HEALTH MAINTENANCE LETTER (OUTPATIENT)
Age: 20
End: 2020-02-23

## 2020-02-24 ENCOUNTER — OFFICE VISIT (OUTPATIENT)
Dept: FAMILY MEDICINE | Facility: CLINIC | Age: 20
End: 2020-02-24
Payer: COMMERCIAL

## 2020-02-24 VITALS
HEIGHT: 67 IN | WEIGHT: 176.2 LBS | BODY MASS INDEX: 27.65 KG/M2 | TEMPERATURE: 98.1 F | HEART RATE: 72 BPM | RESPIRATION RATE: 16 BRPM | SYSTOLIC BLOOD PRESSURE: 100 MMHG | DIASTOLIC BLOOD PRESSURE: 60 MMHG

## 2020-02-24 DIAGNOSIS — Z12.4 SCREENING FOR CERVICAL CANCER: ICD-10-CM

## 2020-02-24 DIAGNOSIS — Z00.00 ROUTINE GENERAL MEDICAL EXAMINATION AT A HEALTH CARE FACILITY: Primary | ICD-10-CM

## 2020-02-24 PROCEDURE — G0145 SCR C/V CYTO,THINLAYER,RESCR: HCPCS | Performed by: NURSE PRACTITIONER

## 2020-02-24 PROCEDURE — 99395 PREV VISIT EST AGE 18-39: CPT | Performed by: NURSE PRACTITIONER

## 2020-02-24 ASSESSMENT — ENCOUNTER SYMPTOMS
VOMITING: 0
WHEEZING: 0
DIZZINESS: 0
FREQUENCY: 0
NUMBNESS: 0
ABDOMINAL DISTENTION: 0
HEADACHES: 0
POLYDIPSIA: 0
DIARRHEA: 0
SLEEP DISTURBANCE: 0
DYSPHORIC MOOD: 0
ACTIVITY CHANGE: 0
NAUSEA: 0
DIFFICULTY URINATING: 0
CHEST TIGHTNESS: 0
ARTHRALGIAS: 0
CONSTIPATION: 0
POLYPHAGIA: 0
LIGHT-HEADEDNESS: 0
COUGH: 0
FATIGUE: 0
NERVOUS/ANXIOUS: 0
PALPITATIONS: 0
RHINORRHEA: 0
SHORTNESS OF BREATH: 0
SORE THROAT: 0
ABDOMINAL PAIN: 0

## 2020-02-24 ASSESSMENT — MIFFLIN-ST. JEOR: SCORE: 1588.93

## 2020-02-24 ASSESSMENT — PAIN SCALES - GENERAL: PAINLEVEL: NO PAIN (0)
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
## 2020-02-24 NOTE — RESULT ENCOUNTER NOTE
Normal/Negative    Please call or email with any additional questions or concerns  DEREK Gurerier CNP

## 2020-02-26 LAB
COPATH REPORT: NORMAL
PAP: NORMAL

## 2020-04-08 ENCOUNTER — VIRTUAL VISIT (OUTPATIENT)
Dept: FAMILY MEDICINE | Facility: OTHER | Age: 20
End: 2020-04-08

## 2020-04-08 NOTE — PROGRESS NOTES
"Date: 2020 14:16:17  Clinician: Avtar Apodaca  Clinician NPI: [id number]  Patient: Tracee Dominguez  Patient : 2000  Patient Address: [address] [place] [address]  Patient Phone: [phone]  Visit Protocol: URI  Patient Summary:  Tracee is a 19 year old ( : 2000 ) female who initiated a Visit for COVID-19 (Coronavirus) evaluation and screening. When asked the question \"Please sign me up to receive news, health information and promotions from [employer].\", Tracee responded \"No\".    Tracee states her symptoms started gradually 3-6 days ago.   Her symptoms consist of rhinitis, myalgia, a sore throat, a cough, nasal congestion, malaise, ear pain, enlarged lymph nodes, chills, nausea, a headache, and wheezing. She is experiencing difficulty breathing due to nasal congestion but she is not short of breath. Tracee also feels feverish but was unable to measure her temperature.   Symptom details     Nasal secretions: The color of her mucus is yellow.    Cough: Tracee coughs a few times an hour and her cough is more bothersome at night. Phlegm comes into her throat when she coughs. She does not believe her cough is caused by post-nasal drip. The color of the phlegm is yellow.     Sore throat: Tracee reports having moderate throat pain (4-6 on a 10 point pain scale), does not have exudate on her tonsils, and can swallow liquids. The lymph nodes in her neck are enlarged. A rash has not appeared on the skin since the sore throat started.     Wheezing: Tracee has been diagnosed with asthma. The wheezing does not interfere with her normal daily activities.    Headache: She states the headache is moderate (4-6 on a 10 point pain scale).      Tracee denies having facial pain or pressure, diarrhea, vomiting, and teeth pain. She also denies taking antibiotic medication for the symptoms, having recent facial or sinus surgery in the past 60 days, having a sinus infection within the past year, and " double sickening (worsening symptoms after initial improvement).   Precipitating events  Within the past week, Tracee has not been exposed to someone with strep throat. She has not recently been exposed to someone with influenza. Tracee has been in close contact with the following high risk individuals: people with asthma, heart disease or diabetes and adults 65 or older.   Pertinent COVID-19 (Coronavirus) information  Tracee has not traveled internationally or to the areas where COVID-19 (Coronavirus) is widespread, including cruise ship travel in the last 14 days before the start of her symptoms.   Tracee has had a close contact with a laboratory-confirmed COVID-19 patient within 14 days of symptom onset. She also has had a close contact with a suspected COVID-19 patient within 14 days of symptom onset. 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 She provides direct patient care. She lives with a healthcare worker.   Pertinent medical history  Tracee typically gets a yeast infection when she takes antibiotics. She is not sure if she has used fluconazole (Diflucan) to treat previous yeast infections.   Tracee needs a return to work/school note.   Weight: 175 lbs   Tracee does not smoke or use smokeless tobacco.   She denies pregnancy and denies breastfeeding. She has menstruated in the past month.   Weight: 175 lbs    MEDICATIONS: Georges 1/20 (19) oral, ALLERGIES: NKDA  Clinician Response:  Dear Tracee,    Based on the information you have provided, you do have symptoms that are consistent with Coronavirus (COVID-19).   The coronavirus causes mild to severe respiratory illness with the most common symptoms including fever, cough and difficulty breathing.  Unfortunately, many viruses cause similar symptoms and it can be difficult to distinguish between viruses, especially in mild cases, so we are presuming that anyone with cough or fever has coronavirus at this time.  Coronavirus/COVID-19 has reached the point of community spread in [place], meaning that we are finding the virus in people with no known exposure risk for theresa the virus. Given the increasing commonness of coronavirus in the community we are no longer testing patients who are not critically ill.  If you are a health care worker, you should refer to your employee health office for instructions about testing and returning to work.  For everyone else who has cough or fever, you should assume you are infected with coronavirus. Since you will not be tested but have symptoms that may be consistent with coronavirus, the CDC recommends you stay in self-isolation until these three things have happened:    You have had no fever for at least 72 hours (that is three full days of no fever without the use of medicine that reduces fevers)    AND   Other symptoms have improved (for example, when your cough or shortness of breath have improved)   AND   At least 7 days have passed since your symptoms first appeared.   How to Isolate:    Isolate yourself at home.   Do Not allow any visitors  Do Not go to work or school  Do Not go to [employer],  centers, shopping, or other public places.  Do Not shake hands.  Avoid close contact with others (hugging, kissing).   Protect Others:    Cover Your Mouth and Nose with a mask, disposable tissue or wash cloth to avoid spreading germs to others.  Wash your hands and face frequently with soap and water.   Managing Symptoms:    At this time, we primarily recommend Tylenol (Acetaminophen) for fever or pain. If you have liver or kidney problems, contact your primary care provider for instructions on use of tylenol. Adults can take 650 mg (two 325 mg pills) by mouth every  4-6 hours as needed OR 1,000 mg (two 500 mg pills) every 8 hours as needed. MAXIMUM DAILY DOSE: 3,000mg. For children, refer to dosing on bottle based on age or weight.  Push fluids  Get plenty of rest  Salt water gargles and lozenges to help with sore throat  Over the counter cough medication and decongestants to help as needed   If you develop significant shortness of breath that prevents you from doing normal activities, please call 911 or proceed to the nearest emergency room and alert them immediately that you have been in self-isolation for possible coronavirus.   If you have a higher risk medical condition such as cancer, heart failure, end stage renal disease on dialysis or have a transplant, please reach out to your specialist's clinic to advise them of your [facility] visit should you not improve within the next two days.  For more information about COVID19 and options for caring for yourself at home, please visit the CDC website at https://www.cdc.gov/coronavirus/2019-ncov/about/steps-when-sick.htmlFor more options for care at [facility], please visit our website at [email or web address]         Diagnosis: Cough  Diagnosis ICD: R05

## 2020-04-15 ENCOUNTER — TELEPHONE (OUTPATIENT)
Dept: FAMILY MEDICINE | Facility: CLINIC | Age: 20
End: 2020-04-15

## 2020-04-15 ENCOUNTER — HOSPITAL ENCOUNTER (EMERGENCY)
Facility: CLINIC | Age: 20
Discharge: STILL ADMITTED OR ADMINISTRATIVE | End: 2020-04-15
Payer: COMMERCIAL

## 2020-04-15 DIAGNOSIS — J30.2 SEASONAL ALLERGIC RHINITIS, UNSPECIFIED TRIGGER: ICD-10-CM

## 2020-04-15 DIAGNOSIS — J45.20 MILD INTERMITTENT ASTHMA WITHOUT COMPLICATION: ICD-10-CM

## 2020-04-15 RX ORDER — ALBUTEROL SULFATE 90 UG/1
2 AEROSOL, METERED RESPIRATORY (INHALATION) EVERY 6 HOURS PRN
Qty: 18 G | Refills: 0 | Status: SHIPPED | OUTPATIENT
Start: 2020-04-15

## 2020-04-15 NOTE — TELEPHONE ENCOUNTER
Pt crying, doesn't know where to go for Covid19 testing. Please see 4/8/20 notes.    Thank you,    Shante Jones, [place]

## 2020-04-15 NOTE — TELEPHONE ENCOUNTER
Spoke with patient she is crying and upset , work at [employer] and she had close contact exposure with her friends, boyfriend  Who was positive for COVID19   She was with him for greater then 2 hours with in few feet   She did on care  4/8/2020 and was advised to self quarantine for 14 days which she has and this Sunday will be 14 days,   She has not had fever but does have cough and asthma  Sore throat and HA, but has been crying a lot today   I called [facility] ED and they are not testing outside health care workers unless they are ill with sx and being admitted   I called Tracee back and advised her and will call [facility] and see what they are asking pt to do,Tracee gave me permission to speak with [facility] staff    Spoke with Hugh  At [facility]    They are requiring staff to have a negative tst to return to work, discuss  We are not testing unless admitted to hospital   He states 2 of his other worker got tested in he thinks [facility] system, he will reach out to Tracee and share the information with her     I called Tracee back advised Hugh would be call and that most improtant she settle her self down so she does not have problems with her asthma  She has had some chest tightness denies SOB or wheezing     Advised to use her inhaler , she only has 17 puffs left  advied I sent a new inhaler for her today and she needs to work on trying to settle down so she does not end up with an asthma problem  She said she would try  And thanked me for helping her   To call back as need   MATIAS Garcia  RN/Kiko Gaona

## 2020-06-10 ENCOUNTER — NURSE TRIAGE (OUTPATIENT)
Dept: NURSING | Facility: CLINIC | Age: 20
End: 2020-06-10
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
## 2020-06-11 ENCOUNTER — OFFICE VISIT (OUTPATIENT)
Dept: FAMILY MEDICINE | Facility: CLINIC | Age: 20
End: 2020-06-11
Payer: COMMERCIAL

## 2020-06-11 VITALS
RESPIRATION RATE: 14 BRPM | HEART RATE: 84 BPM | DIASTOLIC BLOOD PRESSURE: 58 MMHG | OXYGEN SATURATION: 97 % | WEIGHT: 178 LBS | TEMPERATURE: 98.9 F | BODY MASS INDEX: 28.3 KG/M2 | SYSTOLIC BLOOD PRESSURE: 118 MMHG

## 2020-06-11 DIAGNOSIS — R63.5 WEIGHT GAIN: ICD-10-CM

## 2020-06-11 DIAGNOSIS — M22.2X1 PATELLOFEMORAL PAIN SYNDROME OF BOTH KNEES: ICD-10-CM

## 2020-06-11 DIAGNOSIS — M25.562 CHRONIC PAIN OF BOTH KNEES: Primary | ICD-10-CM

## 2020-06-11 DIAGNOSIS — M25.561 CHRONIC PAIN OF BOTH KNEES: Primary | ICD-10-CM

## 2020-06-11 DIAGNOSIS — M22.2X2 PATELLOFEMORAL PAIN SYNDROME OF BOTH KNEES: ICD-10-CM

## 2020-06-11 DIAGNOSIS — G89.29 CHRONIC PAIN OF BOTH KNEES: Primary | ICD-10-CM

## 2020-06-11 DIAGNOSIS — Z82.61 FAMILY HISTORY OF RHEUMATOID ARTHRITIS: ICD-10-CM

## 2020-06-11 LAB
ERYTHROCYTE [SEDIMENTATION RATE] IN BLOOD BY WESTERGREN METHOD: 19 MM/H (ref 0–20)
TSH SERPL DL<=0.005 MIU/L-ACNC: 1.56 MU/L (ref 0.4–4)

## 2020-06-11 PROCEDURE — 85652 RBC SED RATE AUTOMATED: CPT | Performed by: PHYSICIAN ASSISTANT

## 2020-06-11 PROCEDURE — 84443 ASSAY THYROID STIM HORMONE: CPT | Performed by: PHYSICIAN ASSISTANT

## 2020-06-11 PROCEDURE — 99214 OFFICE O/P EST MOD 30 MIN: CPT | Performed by: PHYSICIAN ASSISTANT

## 2020-06-11 PROCEDURE — 86431 RHEUMATOID FACTOR QUANT: CPT | Performed by: PHYSICIAN ASSISTANT

## 2020-06-11 PROCEDURE — 36415 COLL VENOUS BLD VENIPUNCTURE: CPT | Performed by: PHYSICIAN ASSISTANT

## 2020-06-11 PROCEDURE — 86200 CCP ANTIBODY: CPT | Performed by: PHYSICIAN ASSISTANT

## 2020-06-11 NOTE — PATIENT INSTRUCTIONS
Ibuprofen 600 mg ( 3 tabs) with food three times a day for 3-5 days then as needed after that for pain/swelling    Ice     Read handout below    Schedule with physical therapy in [facility] as it is close to your house, otherwise in [place] I would recommend [facility]      I will follow up with labs        Patient Education     Understanding Patellofemoral Syndrome    Patellofemoral syndrome is a condition that causes pain on the front of the knee. The large bones of the upper and lower leg meet at the knee. This joint also includes a small triangle-shaped bone that rests on top of the leg bones. This is the kneecap (patella). Patellofemoral refers to the patella and the thigh bone (femur). These bones are surrounded by connective tissue and muscles. Patellofemoral pain is believed to come from stress on the tissues of and around the knee.  What causes patellofemoral syndrome?  No single cause for patellofemoral pain has been found. But many things are likely to contribute to this type of knee pain. These include:    Actions that put repeated stress on the knee, such as running and squatting    Overtraining at a sport    Weak hip or thigh muscle    Normal variations in the way body parts fit together    Poor form during activities that stress the knee, such as running    A fall or blow to the knee  Symptoms of patellofemoral syndrome  Pain is a common symptom. It s often on the front of the knee, but can be around the kneecap. Pain can occur at certain times, such as when you are:    Running    Sitting for a long time with your knees bent, such as at a movie    Walking up or down stairs    Squatting  Other symptoms may include:    A feeling of the knee catching or locking    A grinding or crackling noise in your knee  Treatment for patellofemoral syndrome  Treatment focuses on reducing pain and avoiding further injury. Treatments may include:    Rest your leg. This gives your knee time to recover. You may need to  avoid or change the activity that caused the problem, such as not running for a while.    Prescription or over-the-counter pain medicines. These help reduce inflammation, swelling, and pain.    Cold packs. These help reduce pain.    Stretches and other exercises. These can improve balance, flexibility, and strength.    A shoe insert (orthotic). This can make your knee more stable.    Elastic tape or a brace. These can make your knee more stable.    Physical therapy. This may include exercises or other treatments.    Surgery. In rare cases, if other treatments don t relieve symptoms, you may need surgery.  Possible complications of patellofemoral syndrome  If you don t give your knee time to heal, symptoms may return or get worse. Follow your healthcare provider s instructions on resting and treating your knee.  When to call your healthcare provider  Call your healthcare provider right away if you have any of these:    Fever of 100.4 F (38 C) or higher, or as directed    Pain that gets worse    Symptoms that don t get better, or get worse    New symptoms   Date Last Reviewed: 3/10/2016    0961-5302 The "[employer]. [address] [place] [address], [place] [address]. All rights reserved. This information is not intended as a substitute for professional medical care. Always follow your healthcare professional's instructions.

## 2020-06-11 NOTE — PROGRESS NOTES
"Subjective     Tracee Dominguez is a 19 year old female who presents to clinic today for the following health issues:    HPI     Joint Pain    Onset: on and off for a few months now getting worst recently, no known injury    Description:   Location: Both knees, R one is worst.  Character: Sharp and Dull ache    Intensity: moderate    Progression of Symptoms: same    Accompanying Signs & Symptoms:  Other symptoms: none    History:   Previous similar pain: no       Precipitating factors:   Trauma or overuse: no     Alleviating factors:  Improved by: rest/inactivity, heat, ice and stretching    Therapies Tried and outcome: Noted above, Helps only a little per pt.    No known injury.   Says she is \"pigeon toed. \" and thinks that could be contributing which makes sense.     bmi 28.       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 Knee pain started shortly after this job.     Used to run but now can't because it is too painful.     Mom got diagnosed with RA. Patient requests blood work for this and thyroid. Mom with thyroid issues. Patient has gained weight over the past year also. No other possibly thyroid symptoms. She also doesn't work out as much so that could be a cause as well.  We will get labs.     No edema. No warmth. No erythema.     R knee hurts the most with stairs.   Hurts when squatting or getting up from squat.     Hurts more with bend knee, feels better extended.     No crepitus.     Some weakness per patient mostly just from pain.       Patient Active Problem List   Diagnosis     Intermittent asthma     Hyperhidrosis     Wart     Birth control     Dyspareunia     Past Surgical History:   Procedure Laterality Date     ENT SURGERY      broke jaw in      NO HISTORY OF SURGERY       REMOVE FOREIGN BODY UPPER EXTREMITY N/A 6/28/2017    Procedure: REMOVE FOREIGN BODY UPPER EXTREMITY;  Removal of Nexplanon;  Surgeon: Chayito" Al Escalona MD;  Location:  OR       Social History     Tobacco Use     Smoking status: Former Smoker     Packs/day: 0.25     Types: Cigarettes     Smokeless tobacco: Never Used     Tobacco comment: 1 cig a day   Substance Use Topics     Alcohol use: No     Family History   Problem Relation Age of Onset     Thyroid Disease Mother      Depression Mother      Anxiety Disorder Mother      Depression Father      Anxiety Disorder Father          Current Outpatient Medications   Medication Sig Dispense Refill     albuterol (PROAIR HFA/PROVENTIL HFA/VENTOLIN HFA) 108 (90 Base) MCG/ACT inhaler Inhale 2 puffs into the lungs every 6 hours as needed for shortness of breath / dyspnea or wheezing 18 g 0     norethindrone-ethinyl estradiol (JUNEL FE 1/20) 1-20 MG-MCG tablet Take 1 tablet by mouth daily 90 tablet 3     No Known Allergies      Reviewed and updated as needed this visit by Provider         Review of Systems   Constitutional, HEENT, cardiovascular, pulmonary, GI, , musculoskeletal, neuro, skin, endocrine and psych systems are negative, except as otherwise noted.      Objective    /58   Pulse 84   Temp 98.9  F (37.2  C) (Tympanic)   Resp 14   Wt 80.7 kg (178 lb)   SpO2 97%   Breastfeeding No   BMI 28.30 kg/m    Body mass index is 28.3 kg/m .  Physical Exam   GENERAL: alert, no distress and over weight  RESP: lungs clear to auscultation - no rales, rhonchi or wheezes  CV: regular rate and rhythm, normal S1 S2, no S3 or S4, no murmur, click or rub, no peripheral edema and peripheral pulses strong  Ortho: both Knees-No gross deformity.  No erythema.  No effusion.  No ecchymosis. No warmth.  Tender to palpation patellafemoral insertion bilaterally and also tender over quad tendons, pretty specific areas of pain. Not tender over joint lines or popliteal region.    Range of motion intact fully.  Sensation intact distally.  Distal pulses strong. Hip, knee, and ankle strength 5/5 and equal bilaterally.   Anterior drawer sign negative. Valgus(MCL)/varus (LCL) testing negative for pain.  McMurrays negative.         SKIN: no suspicious lesions or rashes  NEURO: Normal strength and tone, mentation intact and speech normal  PSYCH: mentation appears normal, affect normal/bright    Diagnostic Test Results:  Labs reviewed in Epic        Assessment & Plan     1. Chronic pain of both knees  Discussed xray wouldn't change our plan, consider in future if needed    - Rheumatoid factor  - Cyclic Citrullinated Peptide Antibody IgG  - ESR: Erythrocyte sedimentation rate    2. Weight gain  If thyroid normal, will have her work on diet and exercises that are comfortable (swimming, etc)  - TSH with free T4 reflex    3. Family history of rheumatoid arthritis    - Rheumatoid factor    4. Patellofemoral pain syndrome of both knees  Needs physical therapy we talked through the entire handout below    - [facility]; Future     Patient Instructions     Ibuprofen 600 mg ( 3 tabs) with food three times a day for 3-5 days then as needed after that for pain/swelling    Ice     Read handout below    Schedule with physical therapy in [facility] as it is close to your house, otherwise in [place] I would recommend [facility]      I will follow up with labs        Patient Education     Understanding Patellofemoral Syndrome    Patellofemoral syndrome is a condition that causes pain on the front of the knee. The large bones of the upper and lower leg meet at the knee. This joint also includes a small triangle-shaped bone that rests on top of the leg bones. This is the kneecap (patella). Patellofemoral refers to the patella and the thigh bone (femur). These bones are surrounded by connective tissue and muscles. Patellofemoral pain is believed to come from stress on the tissues of and around the knee.  What causes patellofemoral syndrome?  No single cause for patellofemoral pain has been found. But many things are likely to  contribute to this type of knee pain. These include:    Actions that put repeated stress on the knee, such as running and squatting    Overtraining at a sport    Weak hip or thigh muscle    Normal variations in the way body parts fit together    Poor form during activities that stress the knee, such as running    A fall or blow to the knee  Symptoms of patellofemoral syndrome  Pain is a common symptom. It s often on the front of the knee, but can be around the kneecap. Pain can occur at certain times, such as when you are:    Running    Sitting for a long time with your knees bent, such as at a movie    Walking up or down stairs    Squatting  Other symptoms may include:    A feeling of the knee catching or locking    A grinding or crackling noise in your knee  Treatment for patellofemoral syndrome  Treatment focuses on reducing pain and avoiding further injury. Treatments may include:    Rest your leg. This gives your knee time to recover. You may need to avoid or change the activity that caused the problem, such as not running for a while.    Prescription or over-the-counter pain medicines. These help reduce inflammation, swelling, and pain.    Cold packs. These help reduce pain.    Stretches and other exercises. These can improve balance, flexibility, and strength.    A shoe insert (orthotic). This can make your knee more stable.    Elastic tape or a brace. These can make your knee more stable.    Physical therapy. This may include exercises or other treatments.    Surgery. In rare cases, if other treatments don t relieve symptoms, you may need surgery.  Possible complications of patellofemoral syndrome  If you don t give your knee time to heal, symptoms may return or get worse. Follow your healthcare provider s instructions on resting and treating your knee.  When to call your healthcare provider  Call your healthcare provider right away if you have any of these:    Fever of 100.4 F (38 C) or higher, or as  directed    Pain that gets worse    Symptoms that don t get better, or get worse    New symptoms   Date Last Reviewed: 3/10/2016    7852-7678 [employer]. [address] [facility], [place] [address]. All rights reserved. This information is not intended as a substitute for professional medical care. Always follow your healthcare professional's instructions.                 Return in about 4 weeks (around 7/9/2020) for if not improving.    Nathalie Norwood PA-C  [facility]

## 2020-06-11 NOTE — LETTER
Melissa 15, 2020      Tracee Alberto  [address] [facility] [address]        Dear ,    We are writing to inform you of your test results.    Dear Tracee,       It was a pleasure to see you at your recent office visit.  Your test results are listed below.  Negative rheumatoid serology. Thyroid normal. Inflammatory marker normal.      If you have any questions or concerns, please call the clinic at [phone].     Sincerely,   Nathalie Norwood PA-C / yaima    Resulted Orders   Rheumatoid factor   Result Value Ref Range    Rheumatoid Factor <7 <12 IU/mL   Cyclic Citrullinated Peptide Antibody IgG   Result Value Ref Range    Cyclic Citrullinated Peptide Antibody, IgG 2 <7 U/mL      Comment:      Negative   ESR: Erythrocyte sedimentation rate   Result Value Ref Range    Sed Rate 19 0 - 20 mm/h   TSH with free T4 reflex   Result Value Ref Range    TSH 1.56 0.40 - 4.00 mU/L       If you have any questions or concerns, please call the clinic at the number listed above.       Sincerely,        Nathalie Norwood PA-C

## 2020-06-12 LAB
CCP AB SER IA-ACNC: 2 U/ML
RHEUMATOID FACT SER NEPH-ACNC: <7 IU/ML (ref 0–20)

## 2020-06-12 NOTE — RESULT ENCOUNTER NOTE
Dear Tracee,      It was a pleasure to see you at your recent office visit.  Your test results are listed below.  Negative rheumatoid serology. Thyroid normal. Inflammatory marker normal.         If you have any questions or concerns, please call the clinic at [phone].    Sincerely,  Nathalie Norwood PA-C

## 2020-08-14 ENCOUNTER — OFFICE VISIT (OUTPATIENT)
Dept: FAMILY MEDICINE | Facility: CLINIC | Age: 20
End: 2020-08-14
Payer: COMMERCIAL

## 2020-08-14 VITALS
RESPIRATION RATE: 12 BRPM | OXYGEN SATURATION: 98 % | HEIGHT: 67 IN | WEIGHT: 183 LBS | TEMPERATURE: 99.3 F | DIASTOLIC BLOOD PRESSURE: 65 MMHG | SYSTOLIC BLOOD PRESSURE: 108 MMHG | BODY MASS INDEX: 28.72 KG/M2 | HEART RATE: 96 BPM

## 2020-08-14 DIAGNOSIS — M62.838 MUSCLE SPASM: ICD-10-CM

## 2020-08-14 DIAGNOSIS — Z78.9 ADVISED ABOUT MANAGEMENT OF WEIGHT: ICD-10-CM

## 2020-08-14 DIAGNOSIS — M54.41 ACUTE BILATERAL LOW BACK PAIN WITH RIGHT-SIDED SCIATICA: Primary | ICD-10-CM

## 2020-08-14 PROCEDURE — 99214 OFFICE O/P EST MOD 30 MIN: CPT | Performed by: PHYSICIAN ASSISTANT

## 2020-08-14 RX ORDER — CYCLOBENZAPRINE HCL 10 MG
10 TABLET ORAL 3 TIMES DAILY PRN
Qty: 20 TABLET | Refills: 0 | Status: SHIPPED | OUTPATIENT
Start: 2020-08-14 | End: 2021-07-27

## 2020-08-14 ASSESSMENT — ENCOUNTER SYMPTOMS
DYSURIA: 0
PARESTHESIAS: 0
NERVOUS/ANXIOUS: 0
HEMATURIA: 0
SHORTNESS OF BREATH: 0
HEARTBURN: 0
COUGH: 0
LIGHT-HEADEDNESS: 0
FEVER: 0
WEAKNESS: 0
ABDOMINAL PAIN: 0

## 2020-08-14 ASSESSMENT — PAIN SCALES - GENERAL: PAINLEVEL: SEVERE PAIN (7)

## 2020-08-14 ASSESSMENT — MIFFLIN-ST. JEOR: SCORE: 1614.77

## 2020-08-14 NOTE — LETTER
August 14, 2020      Tracee Dominguez  [address] [facility] [address]        To Whom It May Concern:    Tracee Dominguez was seen in our clinic. She may return to work 8/17/20 without restrictions.      Sincerely,        Magdalena Kelley PA-C

## 2020-08-14 NOTE — PROGRESS NOTES
Subjective     Tracee Dominguez is a 20 year old female who presents to clinic today for the following health issues:    HPI     Patient notes back pain starting around 9 days ago. 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 Pain is located to lower back, midline, and radiated down to right knee. Pain described as sharp, achy and shooting. No falls or known injuries. No numbness/tingling. No loss of bowel/bladder function.  No prior physical therapy or similar symptoms in the past.    Patient notes she has gained around 30 pounds over the last 2 years.    Back Pain       Duration: 9 days        Specific cause: unsure    Description:   Location of pain: low back both and middle to tailbone  Character of pain: sharp, gnawing and constant  Pain radiation:radiates into the right buttocks and radiates into the right leg  New numbness or weakness in legs, not attributed to pain:  YES- legd    Intensity: Currently 7/10    History:   Pain interferes with job: YES  History of back problems: no prior back problems  Any previous MRI or X-rays: None  Sees a specialist for back pain:  No  Therapies tried without relief: cold, heat, massage and rest, ibuprofen and icy hot    Alleviating factors:   Improved by: none      Precipitating factors:  Worsened by: Lifting, Bending, Sitting, Lying Flat and Walking      Accompanying Signs & Symptoms:  Risk of Fracture:  None  Risk of Cauda Equina:  None  Risk of Infection:  None  Risk of Cancer:  None  Risk of Ankylosing Spondylitis:  Onset at age <35, male, AND morning back stiffness. no     Patient Active Problem List   Diagnosis     Intermittent asthma     Hyperhidrosis     Wart     Birth control     Dyspareunia     Past Surgical History:   Procedure Laterality Date     ENT SURGERY      broke jaw in      NO HISTORY OF SURGERY       REMOVE FOREIGN BODY UPPER EXTREMITY N/A 6/28/2017    Procedure: REMOVE FOREIGN BODY UPPER EXTREMITY;  Removal of Nexplanon;   "Surgeon: Al Stratton MD;  Location:  OR       Social History     Tobacco Use     Smoking status: Former Smoker     Packs/day: 0.25     Types: Cigarettes     Smokeless tobacco: Never Used     Tobacco comment: 1 cig a day   Substance Use Topics     Alcohol use: No     Family History   Problem Relation Age of Onset     Thyroid Disease Mother      Depression Mother      Anxiety Disorder Mother      Depression Father      Anxiety Disorder Father          Current Outpatient Medications   Medication Sig Dispense Refill     albuterol (PROAIR HFA/PROVENTIL HFA/VENTOLIN HFA) 108 (90 Base) MCG/ACT inhaler Inhale 2 puffs into the lungs every 6 hours as needed for shortness of breath / dyspnea or wheezing 18 g 0     norethindrone-ethinyl estradiol (JUNEL FE 1/20) 1-20 MG-MCG tablet Take 1 tablet by mouth daily 90 tablet 3     No Known Allergies    Reviewed and updated as needed this visit by Provider         Review of Systems   Constitutional: Negative for fever.   HENT: Negative for congestion.    Respiratory: Negative for cough and shortness of breath.    Cardiovascular: Negative for chest pain.   Gastrointestinal: Negative for abdominal pain and heartburn.   Genitourinary: Negative for dysuria and hematuria.   Musculoskeletal:        Low back pain   Skin: Negative for rash.   Neurological: Negative for weakness, light-headedness and paresthesias.   Psychiatric/Behavioral: The patient is not nervous/anxious.             Objective    /65 (BP Location: Right arm, Patient Position: Chair, Cuff Size: Adult Large)   Pulse 96   Temp 99.3  F (37.4  C) (Tympanic)   Resp 12   Ht 1.689 m (5' 6.5\")   Wt 83 kg (183 lb)   LMP  (LMP Unknown)   SpO2 98%   Breastfeeding No   BMI 29.09 kg/m    Body mass index is 29.09 kg/m .  Physical Exam  Vitals signs and nursing note reviewed.   Constitutional:       General: She is not in acute distress.     Appearance: Normal appearance.   HENT:      Head: Normocephalic and " atraumatic.   Eyes:      Extraocular Movements: Extraocular movements intact.      Pupils: Pupils are equal, round, and reactive to light.   Neck:      Musculoskeletal: Normal range of motion.   Cardiovascular:      Rate and Rhythm: Normal rate and regular rhythm.      Heart sounds: Normal heart sounds.   Pulmonary:      Effort: Pulmonary effort is normal.      Breath sounds: Normal breath sounds.   Abdominal:      General: Bowel sounds are normal.      Palpations: Abdomen is soft.      Tenderness: There is no abdominal tenderness.   Musculoskeletal: Normal range of motion.      Comments: Able to ambulate slowly  Tenderness to palpation of lumbar paraspinal musculature bilaterally  Positive straight leg raise bilaterally, worse on right  Sensation intact and equal to bilateral lower extremities.   Skin:     General: Skin is warm and dry.   Neurological:      General: No focal deficit present.      Mental Status: She is alert.   Psychiatric:         Mood and Affect: Mood normal.         Behavior: Behavior normal.            Diagnostic Test Results:  Labs reviewed in Epic        Assessment & Plan     1. Acute bilateral low back pain with right-sided sciatica  2. Muscle spasm  Patient's history and physical are consistent with muscle spasm.  Low suspicion for bony injury as patient denies trauma.  Low suspicion for cauda equina as patient denies symptoms.  Patient prescribed Flexeril.  Additionally recommend heat/ice and Tylenol/ibuprofen.  Recommended avoiding activities that cause pain, but staying active.  Referral placed to physical therapy.  - cyclobenzaprine (FLEXERIL) 10 MG tablet; Take 1 tablet (10 mg) by mouth 3 times daily as needed for muscle spasms  Dispense: 20 tablet; Refill: 0  - [facility]FERR[facility]; Future    3. Advised about management of weight  Discussed weight gain as a possible cause for muscle spasm/low back pain.  Discussed long-term plan for weight loss including making 3  small nutrition changes daily with goal of 20 pound weight loss over the next year.  Patient is agreeable.      Recommend follow-up with PCP for recheck in 1 month.      See Patient Instructions    Return in about 4 weeks (around 9/11/2020), or if symptoms worsen or fail to improve.    CHERYL TrimbleC  [facility]3]

## 2020-08-29 ENCOUNTER — VIRTUAL VISIT (OUTPATIENT)
Dept: FAMILY MEDICINE | Facility: OTHER | Age: 20
End: 2020-08-29
Payer: COMMERCIAL

## 2020-08-29 DIAGNOSIS — Z20.822 SUSPECTED 2019 NOVEL CORONAVIRUS INFECTION: Primary | ICD-10-CM

## 2020-08-29 PROCEDURE — 99421 OL DIG E/M SVC 5-10 MIN: CPT | Performed by: FAMILY MEDICINE

## 2020-08-29 NOTE — PROGRESS NOTES
"Date: 2020 14:21:38  Clinician: Mira Zazueta  Clinician NPI: [id number]  Patient: Tracee Dominguez  Patient : 2000  Patient Address: [address] [place] [address]  Patient Phone: [phone]  Visit Protocol: URI  Patient Summary:  Tracee is a 20 year old ( : 2000 ) female who initiated a Visit for COVID-19 (Coronavirus) evaluation and screening. When asked the question \"Please sign me up to receive news, health information and promotions from [employer]..\", Tracee responded \"No\".    Tracee states her symptoms started gradually 3-4 days ago.   Her symptoms consist of a headache, chills, rhinitis, and nausea.   Symptom details     Nasal secretions: The color of her mucus is yellow.    Headache: She states the headache is moderate (4-6 on a 10 point pain scale).      Tracee denies having ear pain, malaise, fever, wheezing, sore throat, teeth pain, ageusia, diarrhea, cough, nasal congestion, vomiting, myalgias, anosmia, and facial pain or pressure. She also denies having a sinus infection within the past year, having recent facial or sinus surgery in the past 60 days, taking antibiotic medication in the past month, and double sickening (worsening symptoms after initial improvement). She is not experiencing dyspnea.   Precipitating events  She has not recently been exposed to someone with influenza. Tracee has been in close contact with the following high risk individuals: people with asthma, heart disease or diabetes, immunocompromised people, and adults 65 or older.   Pertinent COVID-19 (Coronavirus) information  In the past 14 days, Tracee has worked in a congregate living setting.   She either works or volunteers as a healthcare worker or a , or works or volunteers in a healthcare facility. She provides direct patient care. 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 She lives with a healthcare worker.   Tracee has had a close contact with a laboratory-confirmed COVID-19 patient within 14 days of symptom onset.   Since December 2019, Tracee and has not had upper respiratory infection or influenza-like illness. Has not been diagnosed with lab-confirmed COVID-19 test   Pertinent medical history  Tracee typically gets a yeast infection when she takes antibiotics. She is not sure if she has used fluconazole (Diflucan) to treat previous yeast infections.   Tracee does not need a return to work/school note.   Weight: 178 lbs   Tracee does not smoke or use smokeless tobacco.   She denies pregnancy and denies breastfeeding. She does not menstruate.   Weight: 178 lbs    MEDICATIONS: Georges 1/20 (21) oral, ALLERGIES: NKDA  Clinician Response:  Dear Tracee,   Your symptoms show that you may have coronavirus (COVID-19). This illness can cause fever, cough and trouble breathing. Many people get a mild case and get better on their own. Some people can get very sick.  What should I do?  We would like to test you for this virus.   1. Please call [phone] to schedule your visit. Explain that you were referred by [facility] to have a COVID-19 test. Be ready to share your [facility] visit ID number.  The following will serve as your written order for this COVID Test, ordered by me, for the indication of suspected COVID [Z20.828]: The test will be ordered in [employer], our electronic health record, after you are scheduled. It will show as ordered and authorized by Hair Crow MD.  Order: COVID-19 (Coronavirus) PCR for SYMPTOMATIC testing from [facility].      2. When it's time for your COVID test:  Stay at least 6 feet away from others. (If someone will drive you to your test, stay in the backseat, as far away from the  as you can.)   Cover your mouth and nose with a mask, tissue or washcloth.  Go straight to the testing site. Don't make any stops on the way there or back.      3.Starting  "now: Stay home and away from others (self-isolate) until:   You've had no fever---and no medicine that reduces fever---for one full day (24 hours). And...   Your other symptoms have gotten better. For example, your cough or breathing has improved. And...   At least 10 days have passed since your symptoms started.       During this time, don't leave the house except for testing or medical care.   Stay in your own room, even for meals. Use your own bathroom if you can.   Stay away from others in your home. No hugging, kissing or shaking hands. No visitors.  Don't go to work, school or anywhere else.    Clean \"high touch\" surfaces often (doorknobs, counters, handles, etc.). Use a household cleaning spray or wipes. You'll find a full list of  on the EPA website: www.epa.gov/pesticide-registration/list-n-disinfectants-use-against-sars-cov-2.   Cover your mouth and nose with a mask, tissue or washcloth to avoid spreading germs.  Wash your hands and face often. Use soap and water.  Caregivers in these groups are at risk for severe illness due to COVID-19:  o People 65 years and older  o People who live in a nursing home or long-term care facility  o People with chronic disease (lung, heart, cancer, diabetes, kidney, liver, immunologic)  o People who have a weakened immune system, including those who:   Are in cancer treatment  Take medicine that weakens the immune system, such as corticosteroids  Had a bone marrow or organ transplant  Have an immune deficiency  Have poorly controlled HIV or AIDS  Are obese (body mass index of 40 or higher)  Smoke regularly   o Caregivers should wear gloves while washing dishes, handling laundry and cleaning bedrooms and bathrooms.  o Use caution when washing and drying laundry: Don't shake dirty laundry, and use the warmest water setting that you can.  o For more tips, go to www.cdc.gov/coronavirus/2019-ncov/downloads/10Things.pdf.    4.Sign up for [employer]. We know it's scary " to hear that you might have COVID-19. We want to track your symptoms to make sure you're okay over the next 2 weeks. Please look for an email from [employer]---this is a free, online program that we'll use to keep in touch. To sign up, follow the link in the email. Learn more at [email or web address]  How can I take care of myself?   Get lots of rest. Drink extra fluids (unless a doctor has told you not to).   Take Tylenol (acetaminophen) for fever or pain. If you have liver or kidney problems, ask your family doctor if it's okay to take Tylenol.   Adults can take either:    650 mg (two 325 mg pills) every 4 to 6 hours, or...   1,000 mg (two 500 mg pills) every 8 hours as needed.    Note: Don't take more than 3,000 mg in one day. Acetaminophen is found in many medicines (both prescribed and over-the-counter medicines). Read all labels to be sure you don't take too much.   For children, check the Tylenol bottle for the right dose. The dose is based on the child's age or weight.    If you have other health problems (like cancer, heart failure, an organ transplant or severe kidney disease): Call your specialty clinic if you don't feel better in the next 2 days.       Know when to call 911. Emergency warning signs include:    Trouble breathing or shortness of breath Pain or pressure in the chest that doesn't go away Feeling confused like you haven't felt before, or not being able to wake up Bluish-colored lips or face.  Where can I get more information?    [email or web address] [place] -- About COVID-19: [email or web address]   CDC -- What to Do If You're Sick: www.cdc.gov/coronavirus/2019-ncov/about/steps-when-sick.html   CDC -- Ending Home Isolation: www.cdc.gov/coronavirus/2019-ncov/hcp/disposition-in-home-patients.html   CDC -- Caring for Someone: www.cdc.gov/coronavirus/2019-ncov/if-you-are-sick/care-for-someone.html   [facility] -- Interim Guidance for Hospital Discharge to Home:  www.health.[facility].mn.us/diseases/coronavirus/hcp/hospdischarge.pdf   [facility] clinical trials (COVID-19 research studies): clinicalaffairs.[facility]/umn-clinical-trials    Below are the COVID-19 hotlines at the [facility] of Health ([facility]). Interpreters are available.    For health questions: Call 735-071-0199 or 1-205.414.6735 (7 a.m. to 7 p.m.) For questions about schools and childcare: Call 106-395-3329 or 1-785.339.8838 (7 a.m. to 7 p.m.)    Diagnosis: Cough  Diagnosis ICD: R05

## 2020-09-02 DIAGNOSIS — Z20.822 SUSPECTED 2019 NOVEL CORONAVIRUS INFECTION: Primary | ICD-10-CM

## 2020-09-02 PROCEDURE — U0003 INFECTIOUS AGENT DETECTION BY NUCLEIC ACID (DNA OR RNA); SEVERE ACUTE RESPIRATORY SYNDROME CORONAVIRUS 2 (SARS-COV-2) (CORONAVIRUS DISEASE [COVID-19]), AMPLIFIED PROBE TECHNIQUE, MAKING USE OF HIGH THROUGHPUT TECHNOLOGIES AS DESCRIBED BY CMS-2020-01-R: HCPCS | Performed by: GENETIC COUNSELOR, MS

## 2020-09-03 LAB
SARS-COV-2 RNA SPEC QL NAA+PROBE: NOT DETECTED
SPECIMEN SOURCE: NORMAL

## 2020-09-22 DIAGNOSIS — Z30.011 ENCOUNTER FOR INITIAL PRESCRIPTION OF CONTRACEPTIVE PILLS: ICD-10-CM

## 2020-09-25 RX ORDER — NORETHINDRONE ACETATE/ETHINYL ESTRADIOL AND FERROUS FUMARATE 1MG-20(21)
KIT ORAL
Qty: 84 TABLET | Refills: 2 | Status: SHIPPED | OUTPATIENT
Start: 2020-09-25 | End: 2021-07-27

## 2020-09-25 NOTE — TELEPHONE ENCOUNTER
"Prescription approved per [facility] [place].  Zahra Lomas RN    Last office visit: 2/24/2020 with prescribing provider:  melyssa     Requested Prescriptions   Pending Prescriptions Disp Refills     MARUO FE 1/20 1-20 MG-MCG tablet [Pharmacy Med Name: MAURO FE 1/20 1-20MG-MCG TABS] 84 tablet 3     Sig: TAKE ONE TABLET BY MOUTH ONCE DAILY.       Contraceptives Protocol Passed - 9/22/2020  1:53 PM        Passed - Patient is not a current smoker if age is 35 or older        Passed - Recent (12 mo) or future (30 days) visit within the authorizing provider's specialty     Patient has had an office visit with the authorizing provider or a provider within the authorizing providers department within the previous 12 mos or has a future within next 30 days. See \"Patient Info\" tab in inbasket, or \"Choose Columns\" in Meds & Orders section of the refill encounter.              Passed - Medication is active on med list        Passed - No active pregnancy on record        Passed - No positive pregnancy test in past 12 months             "

## 2020-12-06 ENCOUNTER — HEALTH MAINTENANCE LETTER (OUTPATIENT)
Age: 20
End: 2020-12-06

## 2020-12-07 NOTE — PATIENT INSTRUCTIONS
Sacroiliac Joint Injection, Care After  This sheet gives you information about how to care for yourself after your procedure. Your health care provider may also give you more specific instructions. If you have problems or questions, contact your health care provider.  What can I expect after the procedure?  After the procedure, it is common to have bruising or soreness at the injection site.  Follow these instructions at home:  Managing pain and swelling         · Keep a record of your pain (pain log) to share with your health care provider at your follow-up visit. If a long-acting anti-inflammatory medicine (steroid) was included in your injection, you may not notice an improvement in your pain level for a few days.  · Take over-the-counter and prescription medicines only as told by your health care provider.  · Do not use a heating pad and do not apply heat directly to the area after the procedure.  · Ask your health care provider about using cold therapy.  · If directed, put ice on the affected area.  ? Put ice in a plastic bag.  ? Place a towel between your skin and the bag.  ? Leave the ice on for 20 minutes, 2-3 times a day. Do not use cold therapy for more than 24 hours.  · Check your injection site every day for signs of infection. Check for:  ? Redness, swelling, or pain.  ? Fluid or blood.  ? Warmth.  ? Pus or a bad smell.  Activity  · Rest the day of your injection. Avoid doing a lot of activity on the day of the procedure.  · Avoid any activities that take a lot of effort for 24 hours after the injection.  · Return to your normal activities as told by your health care provider. Ask your health care provider what activities are safe for you.  · Do physical therapy exercises as told by your health care provider or physical therapist. These exercises are used to strengthen muscles surrounding the SI joint, and that will help to relieve pain.  General instructions  · If dye was used during your procedure,  Your back pain is likely due to muscle spasm and nerve compression. For treatment:   -Continue with Ibuprofen and Icy Hot  -You have been prescribed Flexeril, a muscle relaxer.  Do not work or drive after taking this medication as it can make you drowsy.  -A referral has been placed to physical therapy to help you build strength around her spine long-term and prevent further muscle spasms.  -Work towards weight loss making 3 small changes each day.  Remember to celebrate the small changes and remember that this is a long-term goal.  -Follow-up with your primary care doctor in 1 month.  Return sooner if you have any questions or concerns, or if symptoms are worsening/changing.      Patient Education     Back Care Tips  Caring for your back  These are things you can do to prevent a recurrence of acute back pain and to reduce symptoms from chronic back pain:    Stay at a healthy weight. If you are overweight, losing weight will help most types of back pain.    Exercise is an important part of recovery from most types of back pain. The muscles behind and in front of the spine support the back. This means strengthening both the back muscles and the abdominal muscles will provide better support for your spine.     Swimming and brisk walking are good overall exercises to improve your fitness level.    Practice safe lifting methods (see below).    Practice good posture when sitting, standing, and walking. Don't sit for a long time. This puts more stress on the lower back than standing or walking.    Wear quality shoes with good arch support. Foot and ankle alignment can affect back symptoms. Don't wear high heels.    Therapeutic massage can help relax the back muscles without stretching them.    During the first 24 to 72 hours after an acute injury or flare-up of chronic back pain, put an ice pack on the painful area for 20 minutes and then remove it for 20 minutes. Do thisover a period of 60 to 90 minutes, or several times  a day. As a safety precaution, don't use a heating pad at bedtime. Sleeping on a heating pad can lead to skin burns or tissue damage.    You can alternate using ice and heat.  Medicines  Talk with your healthcare provider before using medicines, especially if you have other health problems or are taking other medicines.    You may use over-the-counter medicines, such as acetaminophen, ibuprofen, or naprosyn to control pain, unless your healthcare provider prescribed other pain medicine. Talk with your healthcare provider before taking any medicines if you have a chronic condition such ass diabetes, liver or kidney disease, stomach ulcers, or digestive bleeding, or are taking blood thinners.    Be careful if you are given prescription pain medicines, opioids, or medicine for muscle spasm. They can cause drowsiness, and affect your coordination, reflexes, and judgment. Don't drive or operate heavy machinery while taking these types of medicines. Take prescription pain medicine only as prescribed by your healthcare provider.  Lumbar stretch  This simple stretch will help relax muscle spasm and keep your back more limber. If exercise makes your back pain worse, don t do it.    Lie on your back with your knees bent and both feet on the ground.    Slowly raise your left knee to your chest as you flatten your lower back against the floor. Hold for 5 seconds.    Relax and repeat the exercise with your right knee.    Do 10 of these exercises for each leg.  Safe lifting method    Don t bend over at the waist to lift an object off the floor.  Instead, bend your knees and hips in a squat.     Keep your back and head upright    Hold the object close to your body, directly in front of you.    Straighten your legs to lift the object.     Lower the object to the floor in the reverse fashion.    If you must slide something across the floor, push it.    Posture tips  Sitting  Sit in chairs with straight backs or low-back support.  drink plenty of water to flush the dye out of your body.  · Do not take baths, swim, or use a hot tub until your health care provider approves. You may take showers.  · Do not drive for 24 hours if you were given a medicine to help you relax (sedative) during your procedure.  · Do not use any products that contain nicotine or tobacco, such as cigarettes and e-cigarettes. If you need help quitting, ask your health care provider.  · Keep all follow-up visits as told by your health care provider. This is important.  Contact a health care provider if:  · Your pain does not improve or it gets worse.  · You have numbness, tingling, or weakness.  · You have a fever.  · You have redness, swelling, or pain around your injection site.  · You have fluid or blood coming from your injection site.  · Your injection site feels warm to the touch.  · You have pus or a bad smell coming from your injection site.  Get help right away if:  · You have chest pain or shortness of breath.  Summary  · After the procedure, it is common to have bruising or soreness at the injection site.  · Keep a record of your pain (pain log) to share with your health care provider at your follow-up visit.  · Return to your normal activities as told by your health care provider. Ask your health care provider what activities are safe for you.  · Contact your health care provider if you have pain that is getting worse, weakness, numbness, or any sign of infection at your injection site.  This information is not intended to replace advice given to you by your health care provider. Make sure you discuss any questions you have with your health care provider.  Document Revised: 11/30/2018 Document Reviewed: 09/24/2018  Elsevier Patient Education © 2020 [employer]     Keep your knees lower than your hips, with your feet flat on the floor.  When driving, sit up straight. Adjust the seat forward so you are not leaning toward the steering wheel.  A small pillow or rolled towel behind your lower back may help if you are driving long distances.   Standing  When standing for long periods, shift most of your weight to one leg at a time. Switch legs every few minutes.   Sleeping  The best way to sleep is on your side with your knees bent. Put a low pillow under your head to support your neck in a neutral spine position. Don't use thick pillows that bend your neck to one side. Put a pillow between your legs to further relax your lower back. If you sleep on your back, put pillows under your knees to support your legs in a slightly flexed position. Use a firm mattress. If your mattress sags, replace it, or use a 1/2-inch plywood board under the mattress to add support.  Follow-up care  Follow up with your healthcare provider, or as advised.  If X-rays, a CT scan or an MRI scan were taken, they may be reviewed by a radiologist. You will be told of any new findings that may affect your care.  Call 911  Call 911 if any of the following occur:    Trouble breathing    Confusion    Very drowsy    Fainting or loss of consciousness    Rapid or very slow heart rate    Loss of  bowel or bladder control  When to seek medical advice  Call your healthcare provider right away if any of the following occur:    Pain becomes worse or spreads to your arms or legs    Weakness or numbness in one or both arms or legs    Numbness in the groin area  Date Last Reviewed: 6/1/2016 2000-2019 [employer]. [address] [place] [id number]. All rights reserved. This information is not intended as a substitute for professional medical care. Always follow your healthcare professional's instructions.           Patient Education     Exercises to Strengthen Your Lower Back  Strong lower back and  abdominal muscles work together to support your spine. The exercises below will help strengthen the lower back. It is important that you begin exercising slowly and increase levels gradually.  Always begin any exercise program with stretching. If you feel pain while doing any of these exercises, stop and talk to your doctor about a more specific exercise program that better suits your condition.   Low back stretch  The point of stretching is to make you more flexible and increase your range of motion. Stretch only as much as you are able. Stretch slowly. Do not push your stretch to the limit. If at any point you feel pain while stretching, this is your (temporary) limit.    Lie on your back with your knees bent and both feet on the ground.    Slowly raise your left knee to your chest as you flatten your lower back against the floor. Hold for 5 seconds.    Relax and repeat the exercise with your right knee.    Do 10 of these exercises for each leg.    Repeat hugging both knees to your chest at the same time.  Building lower back strength  Start your exercise routine with 10 to 30 minutes a day, 1 to 3 times a day.  Initial exercises  Lying on your back:  1. Ankle pumps: Move your foot up and down, towards your head, and then away. Repeat 10 times with each foot.  2. Heel slides: Slowly bend your knee, drawing the heel of your foot towards you. Then slide your heel/foot from you, straightening your knee. Do not lift your foot off the floor (this is not a leg lift).  3. Abdominal contraction: Bend your knees and put your hands on your stomach. Tighten your stomach muscles. Hold for 5 seconds, then relax. Repeat 10 times.  4. Straight leg raise: Bend one leg at the knee and keep the other leg straight. Tighten your stomach muscles. Slowly lift your straight leg 6 to 12 inches off the floor and hold for up to 5 seconds. Repeat 10 times on each side.  Standin. Wall squats: Stand with your back against the wall. Move  your feet about 12 inches away from the wall. Tighten your stomach muscles, and slowly bend your knees until they are at about a 45 degree angle. Do not go down too far. Hold about 5 seconds. Then slowly return to your starting position. Repeat 10 times.  2. Heel raises: Stand facing the wall. Slowly raise the heels of your feet up and down, while keeping your toes on the floor. If you have trouble balancing, you can touch the wall with your hands. Repeat 10 times.  More advanced exercises  When you feel comfortable enough, try these exercises.  1. Kneeling lumbar extension: Begin on your hands and knees. At the same time, raise and straighten your right arm and left leg until they are parallel to the ground. Hold for 2 seconds and come back slowly to a starting position. Repeat with left arm and right leg, alternating 10 times.  2. Prone lumbar extension: Lie face down, arms extended overhead, palms on the floor. At the same time, raise your right arm and left leg as high as comfortably possible. Hold for 10 seconds and slowly return to start. Repeat with left arm and right leg, alternating 10 times. Gradually build up to 20 times. (Advanced: Repeat this exercise raising both arms and both legs a few inches off the floor at the same time. Hold for 5 seconds and release.)  3. Pelvic tilt: Lie on the floor on your back with your knees bent at 90 degrees. Your feet should be flat on the floor. Inhale, exhale, then slowly contract your abdominal muscles bringing your navel toward your spine. Let your pelvis rock back until your lower back is flat on the floor. Hold for 10 seconds while breathing smoothly.  4. Abdominal crunch: Perform a pelvic tilt (above) flattening your lower back against the floor. Holding the tension in your abdominal muscles, take another breath and raise your shoulder blades off the ground (this is not a full sit-up). Keep your head in line with your body (don t bend your neck forward). Hold for  2 seconds, then slowly lower.  Date Last Reviewed: 6/1/2016 2000-2019 [employer]. [address] [place] [address], [place] [address]. All rights reserved. This information is not intended as a substitute for professional medical care. Always follow your healthcare professional's instructions.           Patient Education     Back Spasm (No Trauma)  Spasm of the back muscles can occur after a sudden forceful twisting or bending such as in a car accident. A spasm can also happen after a simple awkward movement, or after lifting something heavy with poor body positioning. In any case, muscle spasm adds to the pain. Sleeping in an awkward position or on a poor quality mattress can also cause this. Some people respond to emotional stress by tensing the muscles of their back.  Pain that continues may need further assessment or other types of treatment such as physical therapy.  You don't always need X-rays for the first assessment of back pain, unless you had a physical injury such as from a car accident or fall. If your pain continues and doesn't respond to medical treatment, X-rays and other tests may then be done.   Home care    As soon as possible, start sitting or walking again. This will help prevent problems from a long bed rest. These problems include muscle weakness, worsening back stiffness and pain, and blood clots in the legs.    When in bed, try to find a position of comfort. A firm mattress is best. Try lying flat on your back with pillows under your knees. You can also try lying on your side with your knees bent up toward your chest and a pillow between your knees.    Don't sit for long periods. Also limit car rides and travel. This puts more stress on the lower back than standing or walking.     During the first 24 to 72 hours after an injury or flare-up, put an ice pack on the painful area for 20 minutes, then remove it for 20 minutes. Do this over a period of 60 to 90 minutes, or several times a  day. This will reduce swelling and pain. Always wrap ice packs in a thin towel.    You can start with ice, then switch to heat. Heat from a hot shower, hot bath, or heating pad reduces pain and works well for muscle spasms. Put heat on the painful area for 20 minutes, then remove it for 20 minutes. Do this over a period of 60 to 90 minutes, or several times a day. Don't sleep on a heating pad. It can burn or damage skin.    Alternate using ice and heat.    Be aware of safe lifting methods. don't lift anything over 15 pounds until all the pain is gone.  Gentle stretching will help your back heal faster. Do this simple routine 2 to 3 times a day until your back is feeling better.    Lie on your back with your knees bent and both feet on the ground.    Slowly raise your left knee to your chest as you flatten your lower back against the floor. Hold for 20 to 30 seconds.    Relax and repeat the exercise with your right knee.    Do 2 to 3 of these exercises for each leg.    Repeat, hugging both knees to your chest at the same time.    Don't bounce, but use a gentle pull.  Medicines  Talk with your doctor before using medicine, especially if you have other medical problems or are taking other medicines.  You may use over-the-counter medicines such as acetaminophen, ibuprofen, or naprosyn to control pain, unless your healthcare provider prescribed another pain medicine. Talk with your healthcare provider if you have a chronic condition such as diabetes, liver or kidney disease, stomach ulcer, or digestive bleeding, or are taking blood thinners.  Be careful if you are given prescription pain medicine, opioids, or medicine for muscle spasm. They can cause drowsiness, and affect your coordination, reflexes, and judgment. Don't drive or operate heavy machinery when taking these medicines. Take pain medicine only as prescribed by your healthcare provider.  Follow-up care  Follow up with your doctor, or as advised. You may need  physical therapy or more tests.  If X-rays were taken, they may be reviewed by a radiologist. You will be told of any new findings that may affect your care.  Call   Call if any of these occur:    Trouble breathing    Confusion    Drowsiness or trouble awakening    Fainting or loss of consciousness    Rapid or very slow heart rate    Loss of bowel or bladder control  When to seek medical advice  Call your healthcare provider right away if any of these occur:    Pain becomes worse or spreads to your legs    Weakness or numbness in one or both legs    Numbness in the groin or genital area    Fever of 100.4 F (38 C) or higher , or as directed by your healthcare provider    Chills    Burning or pain when passing urine  Date Last Reviewed: 11/1/2018 2000-2019 [employer]. [address] [place] [address], [place] [address]. All rights reserved. This information is not intended as a substitute for professional medical care. Always follow your healthcare professional's instructions.

## 2021-04-11 ENCOUNTER — HEALTH MAINTENANCE LETTER (OUTPATIENT)
Age: 21
End: 2021-04-11

## 2021-07-27 ENCOUNTER — TELEPHONE (OUTPATIENT)
Dept: OBGYN | Facility: CLINIC | Age: 21
End: 2021-07-27

## 2021-07-27 ENCOUNTER — OFFICE VISIT (OUTPATIENT)
Dept: OBGYN | Facility: CLINIC | Age: 21
End: 2021-07-27
Payer: COMMERCIAL

## 2021-07-27 VITALS
TEMPERATURE: 97.6 F | HEART RATE: 64 BPM | DIASTOLIC BLOOD PRESSURE: 75 MMHG | OXYGEN SATURATION: 94 % | HEIGHT: 67 IN | SYSTOLIC BLOOD PRESSURE: 114 MMHG | WEIGHT: 203.8 LBS | BODY MASS INDEX: 31.99 KG/M2

## 2021-07-27 DIAGNOSIS — Z32.00 PREGNANCY EXAMINATION OR TEST, PREGNANCY UNCONFIRMED: Primary | ICD-10-CM

## 2021-07-27 DIAGNOSIS — Z30.011 ENCOUNTER FOR INITIAL PRESCRIPTION OF CONTRACEPTIVE PILLS: ICD-10-CM

## 2021-07-27 DIAGNOSIS — Z30.012 EMERGENCY CONTRACEPTION: ICD-10-CM

## 2021-07-27 LAB
B-HCG SERPL-ACNC: <1 IU/L (ref 0–5)
HCG UR QL: NEGATIVE

## 2021-07-27 PROCEDURE — 99213 OFFICE O/P EST LOW 20 MIN: CPT | Performed by: NURSE PRACTITIONER

## 2021-07-27 PROCEDURE — 84702 CHORIONIC GONADOTROPIN TEST: CPT | Performed by: NURSE PRACTITIONER

## 2021-07-27 PROCEDURE — 36415 COLL VENOUS BLD VENIPUNCTURE: CPT | Performed by: NURSE PRACTITIONER

## 2021-07-27 PROCEDURE — 81025 URINE PREGNANCY TEST: CPT | Performed by: NURSE PRACTITIONER

## 2021-07-27 RX ORDER — LEVONORGESTREL 1.5 MG/1
1.5 TABLET ORAL ONCE
Qty: 1 TABLET | Refills: 0 | Status: SHIPPED | OUTPATIENT
Start: 2021-07-27 | End: 2021-07-27

## 2021-07-27 RX ORDER — NORETHINDRONE ACETATE AND ETHINYL ESTRADIOL 1MG-20(21)
1 KIT ORAL DAILY
Qty: 84 TABLET | Refills: 3 | Status: SHIPPED | OUTPATIENT
Start: 2021-07-27

## 2021-07-27 ASSESSMENT — PAIN SCALES - GENERAL: PAINLEVEL: NO PAIN (0)

## 2021-07-27 ASSESSMENT — MIFFLIN-ST. JEOR: SCORE: 1704.12

## 2021-07-27 NOTE — PROGRESS NOTES
Assessment & Plan     Pregnancy examination or test, pregnancy unconfirmed  Urine pregnancy test in clinic negative. Discussed her questions and concerns regarding her unprotected intercourse and home tests. Per request, Quant HCG today. Will notify her of results once available. We did discuss that it has been over 2 weeks since her initial unprotected intercourse, so negative test would be reassuring, however, only 2 days since last unprotected intercourse so would also recommend repeat HCG in 2 weeks even with Plan B use.   - HCG qualitative urine; Future  - HCG qualitative urine  - hCG Quantitative Pregnancy; Future  - hCG Quantitative Pregnancy; Future  - hCG Quantitative Pregnancy    Emergency contraception  Discussed use, advised this may offset her cycle and it may occur earlier/later or she may experience slight variation in flow. Advised to take it as soon as she picks up from pharmacy.  - levonorgestrel (PLAN B) 1.5 MG tablet; Take 1 tablet (1.5 mg) by mouth once for 1 dose    Encounter for initial prescription of contraceptive pills  Patient will plan repeat HCG in 2 weeks-prefers blood test and this is ordered, but discussed contraception options now. Has tried oral contraceptive pill, Nexplanon, Depo Provera. Would like to plan to use the oral contraceptive pill she has been on most recently. Discussed when to start, possible side effects, taking it regularly and how soon if will be effective. Patient is given an opportunity to ask questions and have them answered.  - norethindrone-ethinyl estradiol (MAURO FE 1/20) 1-20 MG-MCG tablet; Take 1 tablet by mouth daily    DEREK Jacobo CNP  [facility]   Tracee is a 21 year old who presents for the following health issues     HPI     Presents today with concerns of pregnancy.   Patient has been off hormonal contraception since last fall. Had a normal menses approximately 6/12/21. Unprotected intercourse  "7/9/21 and took Plan B within 4 hours. Light bleeding/spotting started 7/16/21. Unprotected intercourse 7/25/21. 7/25/21 had 2 positive home pregnancy tests. 7/26/21 had 1 positive and then later in the day had 2 negative. 1 negative this morning.  No additional bleeding, experiencing mild cramping. Denies nausea, fever, abnormal urinary or other vaginal symptoms. No STI concerns. Would not plan to continue pregnancy. Concerned about her unprotected intercourse 2 days ago, would like prescription for Plan B. Also wants to discuss contraception while she is here if she is not pregnant.    Review of Systems   Constitutional, HEENT, cardiovascular, pulmonary, gi and gu systems are negative, except as otherwise noted.      Objective    /75 (BP Location: Right arm, Patient Position: Sitting, Cuff Size: Adult Large)   Pulse 64   Temp 97.6  F (36.4  C) (Tympanic)   Ht 1.689 m (5' 6.5\")   Wt 92.4 kg (203 lb 12.8 oz)   LMP 07/16/2021 (Exact Date)   SpO2 94%   BMI 32.40 kg/m    Body mass index is 32.4 kg/m .  Physical Exam   GENERAL: healthy, alert and no distress  MS: no gross musculoskeletal defects noted, no edema  SKIN: no suspicious lesions or rashes  PSYCH: mentation appears normal, affect normal/bright    Results for orders placed or performed in visit on 07/27/21 (from the past 24 hour(s))   HCG qualitative urine   Result Value Ref Range    hCG Urine Qualitative Negative Negative     "

## 2021-09-26 ENCOUNTER — HEALTH MAINTENANCE LETTER (OUTPATIENT)
Age: 21
End: 2021-09-26

## 2022-05-07 ENCOUNTER — HEALTH MAINTENANCE LETTER (OUTPATIENT)
Age: 22
End: 2022-05-07

## 2022-06-09 ENCOUNTER — TELEPHONE (OUTPATIENT)
Dept: FAMILY MEDICINE | Facility: CLINIC | Age: 22
End: 2022-06-09
Payer: COMMERCIAL

## 2022-06-09 NOTE — TELEPHONE ENCOUNTER
Patient Quality Outreach    Patient is due for the following:   Asthma  -  ACT needed and AAP  Physical  - due  Chlamydia    NEXT STEPS:   Schedule physical. Address HM at appointment.     Type of outreach:    Sent letter.      Questions for provider review:    None     Sybil Caldera

## 2022-06-09 NOTE — LETTER
June 9, 2022      Tracee Dominguez  [address] [facility] [address]              Dear Tracee,          Your healthcare team cares about your health. To provide you with the best care,   we have reviewed your chart and based on our findings, we see that you are due to:     - OTHER FOLLOW UP:  physical, asthma follow up, chlamydia screening     Please schedule your physical as soon as you are able. At your physical we will plan to further discuss your health maintenance items that are due.    If you have already completed these items, please contact the clinic via phone or   [facility] so your care team can review and update your records. Thank you for   choosing [facility] for your healthcare needs. For any questions,   concerns, or to schedule an appointment please contact the clinic.       Healthy Regards,      Your [facility] Care Team

## 2023-01-08 ENCOUNTER — HEALTH MAINTENANCE LETTER (OUTPATIENT)
Age: 23
End: 2023-01-08

## 2023-06-02 ENCOUNTER — HEALTH MAINTENANCE LETTER (OUTPATIENT)
Age: 23
End: 2023-06-02

## 2024-06-29 ENCOUNTER — HEALTH MAINTENANCE LETTER (OUTPATIENT)
Age: 24
End: 2024-06-29
